# Patient Record
Sex: MALE | Race: OTHER | HISPANIC OR LATINO | URBAN - METROPOLITAN AREA
[De-identification: names, ages, dates, MRNs, and addresses within clinical notes are randomized per-mention and may not be internally consistent; named-entity substitution may affect disease eponyms.]

---

## 2020-11-06 ENCOUNTER — EMERGENCY (EMERGENCY)
Age: 50
LOS: 1 days | Discharge: HOME | End: 2020-11-06
Attending: STUDENT IN AN ORGANIZED HEALTH CARE EDUCATION/TRAINING PROGRAM | Admitting: STUDENT IN AN ORGANIZED HEALTH CARE EDUCATION/TRAINING PROGRAM
Payer: MEDICAID

## 2020-11-06 VITALS
HEART RATE: 96 BPM | OXYGEN SATURATION: 100 % | TEMPERATURE: 98 F | RESPIRATION RATE: 18 BRPM | DIASTOLIC BLOOD PRESSURE: 83 MMHG | SYSTOLIC BLOOD PRESSURE: 118 MMHG

## 2020-11-06 VITALS
HEART RATE: 104 BPM | TEMPERATURE: 98 F | RESPIRATION RATE: 18 BRPM | OXYGEN SATURATION: 100 % | SYSTOLIC BLOOD PRESSURE: 133 MMHG | DIASTOLIC BLOOD PRESSURE: 70 MMHG

## 2020-11-06 PROCEDURE — 99283 EMERGENCY DEPT VISIT LOW MDM: CPT

## 2020-11-06 PROCEDURE — 73590 X-RAY EXAM OF LOWER LEG: CPT | Mod: 26,RT

## 2020-11-06 PROCEDURE — 73610 X-RAY EXAM OF ANKLE: CPT | Mod: 26,RT

## 2020-11-06 RX ORDER — ACETAMINOPHEN 500 MG
975 TABLET ORAL ONCE
Refills: 0 | Status: COMPLETED | OUTPATIENT
Start: 2020-11-06 | End: 2020-11-06

## 2020-11-06 RX ADMIN — Medication 975 MILLIGRAM(S): at 18:35

## 2020-11-06 NOTE — ED PROVIDER NOTE - NS ED ROS FT
General: denies fever, chills  HENT: denies nasal congestion, rhinorrhea  Eyes: denies visual changes, blurred vision  CV: denies chest pain, palpitations  Resp: denies difficulty breathing, cough  Abdominal: denies nausea, vomiting, diarrhea, abdominal pain  : denies urinary pain or discharge  MSK: right ankle pain  Neuro: denies headaches, numbness, tingling  Skin: denies rashes, bruises

## 2020-11-06 NOTE — ED PROCEDURE NOTE - ATTENDING CONTRIBUTION TO CARE
AirCast placed for ankle sprain. XRay negatie for acute fracture. Procedure performed by intern. I was present for all key portions of procedure. Pt tolerated well.

## 2020-11-06 NOTE — ED PROVIDER NOTE - PHYSICAL EXAMINATION
GENERAL: well appearing in no acute distress, non-toxic appearing  HEAD: normocephalic, atraumatic  HENT: airway intact; neck supple  EYES: normal conjunctiva  CARDIAC: regular rate and rhythm, normal S1S2, no appreciable murmurs, 2+ pulses in UE/LE b/l  PULM: normal breath sounds, clear to ascultation bilaterally, no rales, rhonchi, wheezing  GI: abdomen nondistended, soft, nontender, no guarding, rebound tenderness  NEURO: no focal motor or sensory deficits,   MSK: tenderness over right posterior edge medial malleolus; profuse swelling over b/l malleoli; tenderness over calf; negative lance test; neurovascularly intact w/ FROM of foot and knee.   SKIN: well-perfused, extremities warm, no visible rashes

## 2020-11-06 NOTE — ED PROVIDER NOTE - CLINICAL SUMMARY MEDICAL DECISION MAKING FREE TEXT BOX
51yo M coming in for ankle pain after a fall. Exam is notable for b/l malleoli swelling and inability to bare weight likely secondary to fracture vs strain.  Plan: ankle xray + tib/fib films + pain control

## 2020-11-06 NOTE — ED STATDOCS - OBJECTIVE STATEMENT
49 yo M [facility] adolescent children for R ankle swelling following fall from ladder. No LOC, no other injuries. L ankle diffusely swollen, tender. Skin c/d/i, no laceration or open fracture. Unrelated, has pending neurology appointment next week for long standing history of neck pain and parasthesias down neck and back following fall 4 years ago. No pmhx, no meds, NKA. I performed a medical screening examination and determined this patient to be medically stable and will transfer to the [facility] ED for further care. heart and lung exam done and both did not reveal concerns for immediate intervention.

## 2020-11-06 NOTE — ED ADULT TRIAGE NOTE - CHIEF COMPLAINT QUOTE
Arrives from [facility] was working on a ladder and fell onto his right foot now presenting with right foot swelling and redness. Took ibuprofen prior to coming. +pedal pulse to extremity, noted to have some mobility, +sensation to extremity. Denies hitting head or blood thinner use, denies PMH

## 2020-11-06 NOTE — ED PROVIDER NOTE - NSFOLLOWUPCLINICS_GEN_ALL_ED_FT
[facility]  Sports Medicine  [address] [place] [address]  Phone: [phone]  Fax:   Follow Up Time:

## 2020-11-06 NOTE — ED PROVIDER NOTE - NSFOLLOWUPINSTRUCTIONS_ED_ALL_ED_FT
You were evaluated in the Emergency Department for ankle pain after a fall from a ladder.  You were evaluated and examined by a physician, and you had an xray of the ankle and the leg that did not indicate any acute fracture. Based on your evaluation, there are no signs of emergency conditions requiring admission to the hospital on today's workup.        We recommend that you:  1. See your primary care physician within the next 3-5 days Bring a copy of your discharge paperwork (including any test results) to your doctor.  2. We also recommend you see [facility]  3. Tylenol up to 650 mg every 8 hours as needed for pain and/or Motrin up to 600 mg every 6 hours as needed for pain. Take any prescribed medications as instructed:   4. Keep ankle elevated and apply cold compresses 20minutes on/ 20 minutes off.          SEEK IMMEDIATE MEDICAL CARE IF YOU HAVE ANY OF THE FOLLOWING SYMPTOMS: worsening pain, inability to move that body part, numbness or tingling.

## 2020-11-06 NOTE — ED PEDIATRIC TRIAGE NOTE - CHIEF COMPLAINT QUOTE
pt c/o right side ankle pain from today. pt fell off from 6ft ladder and twisted his ankle when he landed on his feet. denies head injury. pt is alert, awake and orientedx3. recent right leg surgery. received motrin PTA.

## 2020-11-06 NOTE — ED PROVIDER NOTE - PATIENT PORTAL LINK FT
You can access the FollowMyHealth Patient Portal offered by [facility] by registering at the following website: [email or web address]. By joining [employer]’s FollowMyHealth portal, you will also be able to view your health information using other applications (apps) compatible with our system.

## 2020-11-06 NOTE — ED PROVIDER NOTE - OBJECTIVE STATEMENT
51yo M coming after falling off a ladder (6ft). Patient was on a ladder outside of his house when the ladder twisted causing him to lose balance. Patient attempted to jump off the ladder before crashing down and landed on his right ankle on top of a rock trail. On the ground he noted his right ankle was inverted and he preceded to twist it back in place and heard a "snap". He was unable to walk immediately after and continues to be unable to bare weight. He has a history of an ankle surgery several years ago with hardware placed on the lateral ankle. He took motrin prior to coming to the ED.

## 2020-11-06 NOTE — ED PROVIDER NOTE - ATTENDING CONTRIBUTION TO CARE
50M p/w R ankle pain. Pt working on ladder when it began to twist - he jumped off falling onto affected limb. No head trauma/LOC. Noted R foot was inverted after fall when he manually "twisted" it back to normal orientation. Unable to bear weight and worsening swelling. +calf pain. No decreased sensation.  Gen: nad  CV: rrr, no murmur  Pulm: clear lungs  Abd: soft, nt,nd  Ext: R ankle swelling b/l malleoli with FROM of toes, cap refill < 2sec and sensation intact  MDM: 50M p/w R ankle pain s/p fall from ~6ft ladder - concern for fracture vs strain, will get XRay ankle and tib/fib given calf pain, pain control

## 2023-04-20 PROBLEM — Z78.9 OTHER SPECIFIED HEALTH STATUS: Chronic | Status: ACTIVE | Noted: 2020-11-06

## 2023-05-22 ENCOUNTER — INPATIENT (INPATIENT)
Facility: HOSPITAL | Age: 53
LOS: 3 days | Discharge: HOME | DRG: 441 | End: 2023-05-26
Attending: HOSPITALIST | Admitting: HOSPITALIST
Payer: MEDICAID

## 2023-05-22 VITALS
WEIGHT: 216.05 LBS | OXYGEN SATURATION: 97 % | SYSTOLIC BLOOD PRESSURE: 122 MMHG | HEIGHT: 68 IN | TEMPERATURE: 98 F | HEART RATE: 77 BPM | DIASTOLIC BLOOD PRESSURE: 84 MMHG | RESPIRATION RATE: 18 BRPM

## 2023-05-22 DIAGNOSIS — Z29.9 ENCOUNTER FOR PROPHYLACTIC MEASURES, UNSPECIFIED: ICD-10-CM

## 2023-05-22 DIAGNOSIS — I81 PORTAL VEIN THROMBOSIS: ICD-10-CM

## 2023-05-22 DIAGNOSIS — R93.2 ABNORMAL FINDINGS ON DIAGNOSTIC IMAGING OF LIVER AND BILIARY TRACT: ICD-10-CM

## 2023-05-22 DIAGNOSIS — Z98.890 OTHER SPECIFIED POSTPROCEDURAL STATES: Chronic | ICD-10-CM

## 2023-05-22 LAB
ALBUMIN SERPL ELPH-MCNC: 3.2 G/DL — LOW (ref 3.5–5)
ALP SERPL-CCNC: 76 U/L — SIGNIFICANT CHANGE UP (ref 40–120)
ALT FLD-CCNC: 39 U/L DA — SIGNIFICANT CHANGE UP (ref 10–60)
ANION GAP SERPL CALC-SCNC: 3 MMOL/L — LOW (ref 5–17)
APPEARANCE UR: CLEAR — SIGNIFICANT CHANGE UP
APTT BLD: 30.4 SEC — SIGNIFICANT CHANGE UP (ref 27.5–35.5)
APTT BLD: 40.2 SEC — HIGH (ref 27.5–35.5)
AST SERPL-CCNC: 21 U/L — SIGNIFICANT CHANGE UP (ref 10–40)
BASOPHILS # BLD AUTO: 0.03 K/UL — SIGNIFICANT CHANGE UP (ref 0–0.2)
BASOPHILS NFR BLD AUTO: 0.5 % — SIGNIFICANT CHANGE UP (ref 0–2)
BILIRUB SERPL-MCNC: 0.6 MG/DL — SIGNIFICANT CHANGE UP (ref 0.2–1.2)
BILIRUB UR-MCNC: NEGATIVE — SIGNIFICANT CHANGE UP
BUN SERPL-MCNC: 14 MG/DL — SIGNIFICANT CHANGE UP (ref 7–18)
CALCIUM SERPL-MCNC: 9.2 MG/DL — SIGNIFICANT CHANGE UP (ref 8.4–10.5)
CHLORIDE SERPL-SCNC: 112 MMOL/L — HIGH (ref 96–108)
CO2 SERPL-SCNC: 25 MMOL/L — SIGNIFICANT CHANGE UP (ref 22–31)
COLOR SPEC: YELLOW — SIGNIFICANT CHANGE UP
COMMENT - URINE: SIGNIFICANT CHANGE UP
CREAT SERPL-MCNC: 0.82 MG/DL — SIGNIFICANT CHANGE UP (ref 0.5–1.3)
DIFF PNL FLD: NEGATIVE — SIGNIFICANT CHANGE UP
EGFR: 104 ML/MIN/1.73M2 — SIGNIFICANT CHANGE UP
EOSINOPHIL # BLD AUTO: 0.03 K/UL — SIGNIFICANT CHANGE UP (ref 0–0.5)
EOSINOPHIL NFR BLD AUTO: 0.5 % — SIGNIFICANT CHANGE UP (ref 0–6)
GLUCOSE SERPL-MCNC: 91 MG/DL — SIGNIFICANT CHANGE UP (ref 70–99)
GLUCOSE UR QL: NEGATIVE — SIGNIFICANT CHANGE UP
HCT VFR BLD CALC: 42.2 % — SIGNIFICANT CHANGE UP (ref 39–50)
HCT VFR BLD CALC: 42.2 % — SIGNIFICANT CHANGE UP (ref 39–50)
HGB BLD-MCNC: 13.9 G/DL — SIGNIFICANT CHANGE UP (ref 13–17)
HGB BLD-MCNC: 14 G/DL — SIGNIFICANT CHANGE UP (ref 13–17)
IMM GRANULOCYTES NFR BLD AUTO: 0.2 % — SIGNIFICANT CHANGE UP (ref 0–0.9)
INR BLD: 1.27 RATIO — HIGH (ref 0.88–1.16)
KETONES UR-MCNC: NEGATIVE — SIGNIFICANT CHANGE UP
LACTATE SERPL-SCNC: 0.7 MMOL/L — SIGNIFICANT CHANGE UP (ref 0.7–2)
LACTATE SERPL-SCNC: 0.9 MMOL/L — SIGNIFICANT CHANGE UP (ref 0.7–2)
LEUKOCYTE ESTERASE UR-ACNC: NEGATIVE — SIGNIFICANT CHANGE UP
LIDOCAIN IGE QN: 258 U/L — SIGNIFICANT CHANGE UP (ref 73–393)
LYMPHOCYTES # BLD AUTO: 1.47 K/UL — SIGNIFICANT CHANGE UP (ref 1–3.3)
LYMPHOCYTES # BLD AUTO: 23.4 % — SIGNIFICANT CHANGE UP (ref 13–44)
MCHC RBC-ENTMCNC: 29.9 PG — SIGNIFICANT CHANGE UP (ref 27–34)
MCHC RBC-ENTMCNC: 30.5 PG — SIGNIFICANT CHANGE UP (ref 27–34)
MCHC RBC-ENTMCNC: 32.9 GM/DL — SIGNIFICANT CHANGE UP (ref 32–36)
MCHC RBC-ENTMCNC: 33.2 GM/DL — SIGNIFICANT CHANGE UP (ref 32–36)
MCV RBC AUTO: 90.8 FL — SIGNIFICANT CHANGE UP (ref 80–100)
MCV RBC AUTO: 91.9 FL — SIGNIFICANT CHANGE UP (ref 80–100)
MONOCYTES # BLD AUTO: 0.82 K/UL — SIGNIFICANT CHANGE UP (ref 0–0.9)
MONOCYTES NFR BLD AUTO: 13.1 % — SIGNIFICANT CHANGE UP (ref 2–14)
NEUTROPHILS # BLD AUTO: 3.92 K/UL — SIGNIFICANT CHANGE UP (ref 1.8–7.4)
NEUTROPHILS NFR BLD AUTO: 62.3 % — SIGNIFICANT CHANGE UP (ref 43–77)
NITRITE UR-MCNC: NEGATIVE — SIGNIFICANT CHANGE UP
NRBC # BLD: 0 /100 WBCS — SIGNIFICANT CHANGE UP (ref 0–0)
NRBC # BLD: 0 /100 WBCS — SIGNIFICANT CHANGE UP (ref 0–0)
PH UR: 8 — SIGNIFICANT CHANGE UP (ref 5–8)
PLATELET # BLD AUTO: 209 K/UL — SIGNIFICANT CHANGE UP (ref 150–400)
PLATELET # BLD AUTO: 211 K/UL — SIGNIFICANT CHANGE UP (ref 150–400)
POTASSIUM SERPL-MCNC: 4 MMOL/L — SIGNIFICANT CHANGE UP (ref 3.5–5.3)
POTASSIUM SERPL-SCNC: 4 MMOL/L — SIGNIFICANT CHANGE UP (ref 3.5–5.3)
PROT SERPL-MCNC: 7.4 G/DL — SIGNIFICANT CHANGE UP (ref 6–8.3)
PROT UR-MCNC: 15 MG/DL
PROTHROM AB SERPL-ACNC: 15.1 SEC — HIGH (ref 10.5–13.4)
RBC # BLD: 4.59 M/UL — SIGNIFICANT CHANGE UP (ref 4.2–5.8)
RBC # BLD: 4.65 M/UL — SIGNIFICANT CHANGE UP (ref 4.2–5.8)
RBC # FLD: 12 % — SIGNIFICANT CHANGE UP (ref 10.3–14.5)
RBC # FLD: 12.1 % — SIGNIFICANT CHANGE UP (ref 10.3–14.5)
RBC CASTS # UR COMP ASSIST: SIGNIFICANT CHANGE UP /HPF (ref 0–2)
SODIUM SERPL-SCNC: 140 MMOL/L — SIGNIFICANT CHANGE UP (ref 135–145)
SP GR SPEC: 1.01 — SIGNIFICANT CHANGE UP (ref 1.01–1.02)
UROBILINOGEN FLD QL: NEGATIVE — SIGNIFICANT CHANGE UP
WBC # BLD: 6.28 K/UL — SIGNIFICANT CHANGE UP (ref 3.8–10.5)
WBC # BLD: 7.21 K/UL — SIGNIFICANT CHANGE UP (ref 3.8–10.5)
WBC # FLD AUTO: 6.28 K/UL — SIGNIFICANT CHANGE UP (ref 3.8–10.5)
WBC # FLD AUTO: 7.21 K/UL — SIGNIFICANT CHANGE UP (ref 3.8–10.5)
WBC UR QL: SIGNIFICANT CHANGE UP /HPF (ref 0–5)

## 2023-05-22 PROCEDURE — 99223 1ST HOSP IP/OBS HIGH 75: CPT | Mod: GC

## 2023-05-22 PROCEDURE — 99291 CRITICAL CARE FIRST HOUR: CPT

## 2023-05-22 PROCEDURE — 71250 CT THORAX DX C-: CPT | Mod: 26

## 2023-05-22 PROCEDURE — 74177 CT ABD & PELVIS W/CONTRAST: CPT | Mod: 26,MG

## 2023-05-22 PROCEDURE — 99221 1ST HOSP IP/OBS SF/LOW 40: CPT

## 2023-05-22 PROCEDURE — G1004: CPT

## 2023-05-22 RX ORDER — HEPARIN SODIUM 5000 [USP'U]/ML
INJECTION INTRAVENOUS; SUBCUTANEOUS
Qty: 25000 | Refills: 0 | Status: DISCONTINUED | OUTPATIENT
Start: 2023-05-22 | End: 2023-05-22

## 2023-05-22 RX ORDER — HEPARIN SODIUM 5000 [USP'U]/ML
8000 INJECTION INTRAVENOUS; SUBCUTANEOUS EVERY 6 HOURS
Refills: 0 | Status: DISCONTINUED | OUTPATIENT
Start: 2023-05-22 | End: 2023-05-22

## 2023-05-22 RX ORDER — ENOXAPARIN SODIUM 100 MG/ML
100 INJECTION SUBCUTANEOUS EVERY 12 HOURS
Refills: 0 | Status: DISCONTINUED | OUTPATIENT
Start: 2023-05-22 | End: 2023-05-26

## 2023-05-22 RX ORDER — SODIUM CHLORIDE 9 MG/ML
1000 INJECTION INTRAMUSCULAR; INTRAVENOUS; SUBCUTANEOUS ONCE
Refills: 0 | Status: COMPLETED | OUTPATIENT
Start: 2023-05-22 | End: 2023-05-22

## 2023-05-22 RX ORDER — HEPARIN SODIUM 5000 [USP'U]/ML
8000 INJECTION INTRAVENOUS; SUBCUTANEOUS ONCE
Refills: 0 | Status: COMPLETED | OUTPATIENT
Start: 2023-05-22 | End: 2023-05-22

## 2023-05-22 RX ORDER — HEPARIN SODIUM 5000 [USP'U]/ML
4000 INJECTION INTRAVENOUS; SUBCUTANEOUS EVERY 6 HOURS
Refills: 0 | Status: DISCONTINUED | OUTPATIENT
Start: 2023-05-22 | End: 2023-05-22

## 2023-05-22 RX ORDER — SODIUM CHLORIDE 9 MG/ML
1000 INJECTION INTRAMUSCULAR; INTRAVENOUS; SUBCUTANEOUS
Refills: 0 | Status: DISCONTINUED | OUTPATIENT
Start: 2023-05-22 | End: 2023-05-23

## 2023-05-22 RX ADMIN — HEPARIN SODIUM 1800 UNIT(S)/HR: 5000 INJECTION INTRAVENOUS; SUBCUTANEOUS at 13:26

## 2023-05-22 RX ADMIN — HEPARIN SODIUM 8000 UNIT(S): 5000 INJECTION INTRAVENOUS; SUBCUTANEOUS at 13:26

## 2023-05-22 RX ADMIN — SODIUM CHLORIDE 100 MILLILITER(S): 9 INJECTION INTRAMUSCULAR; INTRAVENOUS; SUBCUTANEOUS at 13:35

## 2023-05-22 RX ADMIN — SODIUM CHLORIDE 100 MILLILITER(S): 9 INJECTION INTRAMUSCULAR; INTRAVENOUS; SUBCUTANEOUS at 18:18

## 2023-05-22 RX ADMIN — SODIUM CHLORIDE 1000 MILLILITER(S): 9 INJECTION INTRAMUSCULAR; INTRAVENOUS; SUBCUTANEOUS at 10:30

## 2023-05-22 RX ADMIN — SODIUM CHLORIDE 1000 MILLILITER(S): 9 INJECTION INTRAMUSCULAR; INTRAVENOUS; SUBCUTANEOUS at 10:13

## 2023-05-22 RX ADMIN — HEPARIN SODIUM 8000 UNIT(S): 5000 INJECTION INTRAVENOUS; SUBCUTANEOUS at 13:34

## 2023-05-22 RX ADMIN — ENOXAPARIN SODIUM 100 MILLIGRAM(S): 100 INJECTION SUBCUTANEOUS at 18:18

## 2023-05-22 NOTE — ED ADULT NURSE NOTE - NSFALLUNIVINTERV_ED_ALL_ED
Bed/Stretcher in lowest position, wheels locked, appropriate side rails in place/Call bell, personal items and telephone in reach/Instruct patient to call for assistance before getting out of bed/chair/stretcher/Non-slip footwear applied when patient is off stretcher/[place] to call system/Physically safe environment - no spills, clutter or unnecessary equipment/Purposeful proactive rounding/Room/bathroom lighting operational, light cord in reach

## 2023-05-22 NOTE — PATIENT PROFILE ADULT - NSPROHMSYMPCOND_GEN_A_NUR
Liver enzyme improved and all are now normal  Autoimmune screen negative  Hepatitis screen negative for B, shows she is non immune and should get the Hep B vaccine. If she has had any of the series, she should get booster. If she has not had it, get entire 3 part series via nurse visits. none

## 2023-05-22 NOTE — CONSULT NOTE ADULT - SUBJECTIVE AND OBJECTIVE BOX
HPI:   53 year old male with PMH of cholelithiasis and PSH of bilateral ablation of varicose veins presented to the ED with his daughter who provides translation at bedside. Patient complaining of abdominal pain with bloating and fatigue every time he eats for the past 10 days.  Tolerating diet. Last BM this AM without blood in the stool. Admits to subjective fevers last night. Denies chest pain, shortness of breath, nausea, vomiting, diarrhea, difficulty walking, history of clotting disorder. Denies current smoking, drinking, and drug abuse. Patient had a history of drinking, but quit 7 years ago.    PAST MEDICAL & SURGICAL HISTORY:  bilateral lower extremity ablation for varicose veins at [facility] 9/3/22    Review of Systems: Contained within HPI     MEDICATIONS  (STANDING):  heparin   Injectable 8000 Unit(s) IV Push once  heparin  Infusion.  Unit(s)/Hr (18 mL/Hr) IV Continuous <Continuous>    MEDICATIONS  (PRN):  heparin   Injectable 8000 Unit(s) IV Push every 6 hours PRN For aPTT less than 40  heparin   Injectable 4000 Unit(s) IV Push every 6 hours PRN For aPTT between 40 - 57    Allergies: No Known Allergies    SOCIAL HISTORY: Denies smoking, ETOH abuse, drug abuse    Vital Signs Last 24 Hrs  T(C): 36.7 (22 May 2023 08:42), Max: 36.7 (22 May 2023 08:42)  T(F): 98 (22 May 2023 08:42), Max: 98 (22 May 2023 08:42)  HR: 77 (22 May 2023 08:42) (77 - 77)  BP: 122/84 (22 May 2023 08:42) (122/84 - 122/84)  RR: 18 (22 May 2023 08:42) (18 - 18)  SpO2: 97% (22 May 2023 08:42) (97% - 97%)  Parameters below as of 22 May 2023 08:42  Patient On (Oxygen Delivery Method): room air    Physical Exam:  General:  Appears stated age, well-groomed, well-nourished, no distress  Eyes: EOMI  HENT:  WNL, no JVD  Chest: respirations nonlabored  Cardiovascular:  Regular rate & rhythm  Abdomen: soft, mild distention, nontender to palpation  Extremities: no edema bilaterally, warm  Vascular: Palpable DP and PT pulses bilaterally  Skin: warm and dry  Musculoskeletal: no calf tenderness  Neuro:  Alert, oriented to time, place and person   Psych: normal affect    LABS:                        14.0   6.28  )-----------( 211      ( 22 May 2023 09:59 )             42.2         140  |  112<H>  |  14  ----------------------------<  91  4.0   |  25  |  0.82    Ca    9.2      22 May 2023 09:59    TPro  7.4  /  Alb  3.2<L>  /  TBili  0.6  /  DBili  x   /  AST  21  /  ALT  39  /  AlkPhos  76      PT/INR - ( 22 May 2023 12:33 )   PT: 15.1 sec;   INR: 1.27 ratio      Lactate, Blood: 0.7 mmol/L (23 @ 09:59)    PTT - ( 22 May 2023 12:33 )  PTT:30.4 sec  Urinalysis Basic - ( 22 May 2023 09:59 )    Color: Yellow / Appearance: Clear / S.015 / pH: x  Gluc: x / Ketone: Negative  / Bili: Negative / Urobili: Negative   Blood: x / Protein: 15 mg/dL / Nitrite: Negative   Leuk Esterase: Negative / RBC: 0-2 /HPF / WBC 0-2 /HPF   Sq Epi: x / Non Sq Epi: x / Bacteria: x    RADIOLOGY & ADDITIONAL STUDIES:  < from: CT Abdomen and Pelvis w/ IV Cont (23 @ 11:08) >  ACC: [id number] EXAM:  CT ABDOMEN AND PELVIS IC   ORDERED BY: SERG MARTE     PROCEDURE DATE:  2023      INTERPRETATION:  CLINICAL INFORMATION: Abdominal pain and distention    COMPARISON: None.    CONTRAST/COMPLICATIONS:  IV Contrast: Omnipaque 350  90 cc administered   10 cc discarded  Oral Contrast: NONE  Complications: None reported at time of study completion    PROCEDURE:  CT of the Abdomen and Pelvis was performed.  Sagittal and coronal reformats were performed.    FINDINGS:  LOWER CHEST: Within normal limits.    LIVER: Small hypodense lesion in the right hepatic lobe, too small to   characterize. Mild heterogeneous enhancement pattern of the liver.  BILE DUCTS: Normal caliber.  GALLBLADDER: Cholelithiasis. No evidence for thickened gallbladder wall   or pericholecystic fluid.  SPLEEN: Within normal limits.  PANCREAS: Within normal limits.  ADRENALS: Within normal limits.  KIDNEYS/URETERS: Within normal limits except for a few small hypodense   lesions in the left kidney, too small to characterize.    BLADDER: Within normal limits.  REPRODUCTIVE ORGANS: The prostate and seminal vesicles appear grossly   unremarkable.    BOWEL: No bowel obstruction. Appendix within normal limits.  PERITONEUM: No free air.Mesenteric edema and trace mesenteric ascites.  VESSELS: Filling defects are seen in the left portal vein, right portal   vein, main portal vein, portal confluence, SMV, upper IMV, and distal   splenic vein, likely representing thrombi. Associated varices. Moderate   stenosis at the proximal left internal iliac artery, likely due to   atherosclerotic disease.  RETROPERITONEUM/LYMPH NODES: No lymphadenopathy.  ABDOMINAL WALL: Small fat-containing umbilical hernia. Mild   fat-containing inguinal hernias bilaterally.  BONES: Degenerative spondylosis.    IMPRESSION:  Extensive thrombosis of the portal veins, portal confluence, SMV, upper   IVM, and the distal splenic vein. Associated varices. Associated   heterogeneous enhancement pattern of the liver. Mesenteric edema and   trace mesenteric ascites for which associated bowel ischemia is   considered. Clinical correlation is recommended.    Cholelithiasis. No evidence for thickened gallbladder wall or   pericholecystic fluid.    Dr. SERG MARTE is informed with read back.    --- End of Report ---    SHIRLEY MAGDALENO MD; Attending Radiologist  This document has been electronically signed. May 22 2023 11:46AM    < end of copied text >

## 2023-05-22 NOTE — ED PROVIDER NOTE - CLINICAL SUMMARY MEDICAL DECISION MAKING FREE TEXT BOX
ATTG: : Assessment/plan: Abdominal pain with distention concerns include but not limited to ascites, less likely obstruction or acute cholecystitis we will check labs we will check urine we will check CT abdomen pelvis IV fluids pain medication and reeval for disposition.

## 2023-05-22 NOTE — CONSULT NOTE ADULT - NS ATTEND AMEND GEN_ALL_CORE FT
Patient seen/examined. 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 Abdomen is benign. Recommend close monitoring with serial bloodwork/abdominal exams. Full anticoagulation. General surgery consultation for assessment of bowel viability. Hematology/hematology consultation for hypercoagulable workup. Will follow.

## 2023-05-22 NOTE — CONSULT NOTE ADULT - NSCONSULTADDITIONALINFOA_GEN_ALL_CORE
Pt seen and examined in ED. Daughter Bharath at bedside provides assistance with translation. Patient reports a 7-10 day hx of nondescript abdominal pain, worse after eating and at times gets bloated.  His daughter offers history that her dad works out every day at the gym b/t 6AM to noon. He reports he stays hydrated and drinks a lot of water during his workouts. He works in construction.  Last BM and flatus prior to coming to ED.   Labs reviewed, WBC nml, lac 0.7. CT images reviewed  trace mesenteric edema/fluid, no FA no pneumatosis  Gen- NAD. Abd- soft, non distended, minimal upper abdominal discomfort to palpation, no guarding no rebound.  Vascular consulted, recommend systemic anticoagulation. Will consult hepatology, ICU.    Lactate nml and abdominal exam benign, no obvious indication for urgent diagnostic laparoscopy. Will monitor clsoely with serial abdominal exams

## 2023-05-22 NOTE — ED PROVIDER NOTE - PHYSICAL EXAMINATION
Gen.  mild discomfort, no acute respiratory distress  HEENT:  perrl eomi  Lungs:  b/l bs  CVS: S1S2   Abd;  no guarding no distention, diffuse mild tenerness  Ext: no edema no erythema  Neuro:aaox3, no focal deficits  MSK: strength 5/5 b/l upper and lower ext.

## 2023-05-22 NOTE — CONSULT NOTE ADULT - NS ATTEND AMEND GEN_ALL_CORE FT
CC: PVT in setting of possible hepatic lesions  HPI: 53 yrs old M w/ PMH of EtOH use s/p  bilateral varicose veins surgery and Rt leg plate after fx p/w ab pain/bloating and found w/ portal vein thrombus.  He had a recent colonoscopy about one month ago which showed a benign 7 mm polyp in the transverse colon    INTERVAL HPI: Patient seen and examined at bedside; Events noted; interview done via [employer] phone bank  in ED; Patient c/o RUE discomfort/bloating.    PMH/PSH as above    FmHx/Sox Hx NC    ROS as above; pt is not able to provide detailed review of systems  General: Noncontributory;	Skin/Breast: NC;Ophthalmologic:NC; ENMT: NC; Respiratory and Thorax: NC; Cardiovascular: NC; 	  Gastrointestinal: NC; Genitourinary:NC;  Musculoskeletal:NC; Neurological: NC; Psychiatric: NC; Hematology/Lymphatics: NC; Endocrine: NC; Allergic/Immunologic: NC    MEDICATIONS  (STANDING):  enoxaparin Injectable 100 milliGRAM(s) SubCutaneous every 12 hours  sodium chloride 0.9%. 1000 milliLiter(s) (100 mL/Hr) IV Continuous <Continuous>    MEDICATIONS  (PRN):      Vital Signs Last 24 Hrs  T(C): 36.8 (22 May 2023 15:35), Max: 36.8 (22 May 2023 15:35)  T(F): 98.3 (22 May 2023 15:35), Max: 98.3 (22 May 2023 15:35)  HR: 76 (22 May 2023 15:35) (76 - 77)  BP: 122/85 (22 May 2023 15:35) (122/84 - 122/85)  RR: 18 (22 May 2023 15:35) (18 - 18)  SpO2: 97% (22 May 2023 15:35) (97% - 97%)    Parameters below as of 22 May 2023 08:42  Patient On (Oxygen Delivery Method): room air      _________________  PHYSICAL EXAM:  ---------------------------  GEN: NAD; NC/AT; A and O x 3  HEENT: PERRLA: EOMI; supple  LUNGS: no wheezing; decreased bilateral air entry; no use of accessory muscles for breathing  HEART: Nl S1 S2; no M   ABDOMEN: Soft, RUQ discomfort w/ deep palpation, non distended  EXTREMITIES: no cyanosis; no edema; warm and dry  NERVOUS SYSTEM:  Awake and alert; no focal neuro  deficits    _________________________________________________  LABS:                        14.0   6.28  )-----------( 211      ( 22 May 2023 09:59 )             42.2     05-22    140  |  112<H>  |  14  ----------------------------<  91  4.0   |  25  |  0.82    Ca    9.2      22 May 2023 09:59    TPro  7.4  /  Alb  3.2<L>  /  TBili  0.6  /  DBili  x   /  AST  21  /  ALT  39  /  AlkPhos  76  05-22    PT/INR - ( 22 May 2023 12:33 )   PT: 15.1 sec;   INR: 1.27 ratio         PTT - ( 22 May 2023 12:33 )  PTT:30.4 sec    A/P    Problem #1 PVT - unprovoked; unclear etiology; will need CT chest and MRI of the liver  -continue w/ heparin drip or transition to LMWH at 1 mg/kg q12 or DOAC (i.e. apixaban loading dose followed by maintenance dose) if no isurgical intervention is planned  -agree w/ malignancy work up as well as MPN and APLA and LDH    Problem #2 Liver lesion - CT finding suggestive of abnormal lesions; obtain MRI, liver protocol  -obtain AFP, CEA, CA 19-9 as well as coags and hep B/C/HIV screening  -obtain outside colonoscopy report    I have examined the patient at bedside and reviewed patient's data and participated in the management of the patient along with Valeria Gonzalez well as hemotology/med oncology faculty consisting of Dr. OMAR Leal, Dr. OMAR Hutton, Dr. Chris Salazar, Dr. Ritu Messer, Dr. Titus Vora as well as myself during the daily heme/onc case review. I reviewed pertinent clinical information, PE,  labs as well as A/P as outline above.     Call with questions [phone]    Miguel Angel Calvin MD

## 2023-05-22 NOTE — H&P ADULT - NSHPPHYSICALEXAM_GEN_ALL_CORE
GENERAL: NAD, lying in bed comfortably  HEAD:  Atraumatic, Normocephalic  EYES: EOMI, PERRLA, conjunctiva and sclera clear  ENT: Moist mucous membranes  NECK: Supple, No JVD  CHEST/LUNG: Clear to auscultation bilaterally; No rales, rhonchi, wheezing, or rubs. Unlabored respirations  HEART: Regular rate and rhythm; No murmurs, rubs, or gallops  ABDOMEN: Bowel sounds present; Soft, + mild tenderness in the epigastric and RUQ, + mild rebound tenderness in RUQ, mildly distended, no visible varicose veins noted.   EXTREMITIES:  2+ Peripheral Pulses, brisk capillary refill. No clubbing, cyanosis, or edema  NERVOUS SYSTEM:  Alert & Oriented X3, speech clear. No deficits   MSK: FROM all 4 extremities, full and equal strength  SKIN: No rashes or lesions

## 2023-05-22 NOTE — PROGRESS NOTE ADULT - SUBJECTIVE AND OBJECTIVE BOX
By to see pt. In ED holding. Reports passed flatus had a watery BM, denies any blood. He denies any abdominal pain at the present.  No heparin gtt seen at bedside. Was discontinued and pt started on full dose Lovenox Q12 per Hem-onc recommendations.  Pt confirms her received the shot.  Abd- soft, non distended, no guarding no rebound.  Asked Nurse to reconnect pt to IVF. Vitals reviewed, HD stable normotensive, HR 73.  Abd exam benign. Continue IVF, NPO for now. Continue anticoagulation. Recheck lactate

## 2023-05-22 NOTE — H&P ADULT - PROBLEM SELECTOR PLAN 2
No hx of cancer, weight loss, or other constitutional symptoms,   Pt quit smoking and drinking for the past 8 yrs, no hx of cirrhosis  CT abd and pelvis w IV contrast: Small hypodense lesion in the right hepatic lobe, too small to characterize. Mild heterogeneous enhancement pattern of the liver.  - f/u tumor markers AFP, CEA,   - QMA consulted

## 2023-05-22 NOTE — ED PROVIDER NOTE - OBJECTIVE STATEMENT
53-year-old male with PMHx of peripheral vascular disease presents for abdominal pain and distention for approximately 10 days.  He said that he had been diagnosed with gallstones approximately 1 month ago but deferred surgery at the time.  He states that he has been cautious with his diet.  Now anytime after he eats he feels like his abdomen is distended and feels some discomfort described as pressure-like.  There is no associated fever or chills.  There is no nausea vomiting or diarrhea.  There is no recent travel or antibiotics.  There is no urinary symptoms.  His daughter is with him at the bedside providing additional history.

## 2023-05-22 NOTE — CONSULT NOTE ADULT - SUBJECTIVE AND OBJECTIVE BOX
Reason for Admission: Portal vein thrombosis and suspected malignancy  History of Present Illness:   53 yrs old M, previous heavy alcohol drinker and smoker, pshx of s/p  bilateral varicose veins surgery and Rt leg plate after fx, presented with increased post-prandial bloating. Pt reported that he has been having bloating after food for the past 10 days which has increased in intensity however for the past day it got worse and was associated with hyperventilation, subjective fever. Pt stated that he was not able to bend forward, Pt denied nausea, vomiting, diarrhea, weight loss, changes in the bowel habit, hematemesis, hematochezia, or dark stools.   Pt was a heavy alcohol drink and smoked one pack of cigarettes daily for 22 yrs however he has quit both for the past 8 yrs. He exercises daily and uses pre-workout pills and protein powder post workout. Family hx is only significant for DM in father. Pt denied any medications including over the counter and herbal.   He had a recent colonoscopy about one month ago which showed a benign 7 mm polyp in the transverse colon      #[id number]    REVIEW OF SYSTEMS:    CONSTITUTIONAL: No weakness, +subjective fever [resolved], No chills  EYES/ENT: No visual changes;  No vertigo or throat pain   NECK: No pain or stiffness  RESPIRATORY: No cough, wheezing, hemoptysis; No shortness of breath  CARDIOVASCULAR: No chest pain or palpitations  GASTROINTESTINAL: No abdominal or epigastric pain. + abdominal bloating, No nausea, vomiting, or hematemesis; No diarrhea or constipation. No melena or hematochezia.  GENITOURINARY: No dysuria, frequency or hematuria  NEUROLOGICAL: No numbness or weakness  SKIN: No itching, rashes      Allergies and Intolerances:        Allergies:  	No Known Allergies:     Home Medications:   * Outpatient Medication Status not yet specified    .    Patient History:    Past Medical, Past Surgical, and Family History:  PAST MEDICAL HISTORY:  No pertinent past medical history.     PAST SURGICAL HISTORY:  H/O varicose vein ligation.     FAMILY HISTORY:  Father  Still living? Unknown  FH: type 2 diabetes, Age at diagnosis: Age Unknown.     Social History:  · Substance use	No  · Social History (marital status, living situation, occupation, and sexual history)	Pt is , living with family, previous heavy alcohol drinker and smoker about a pack for 22 yrs. Quit both about 8 yrs ago, denied any substance use.      MEDICATIONS  (STANDING):  heparin   Injectable 8000 Unit(s) IV Push once  heparin  Infusion.  Unit(s)/Hr (18 mL/Hr) IV Continuous <Continuous>  sodium chloride 0.9%. 1000 milliLiter(s) (100 mL/Hr) IV Continuous <Continuous>    MEDICATIONS  (PRN):  heparin   Injectable 4000 Unit(s) IV Push every 6 hours PRN For aPTT between 40 - 57  heparin   Injectable 8000 Unit(s) IV Push every 6 hours PRN For aPTT less than 40    CAPILLARY BLOOD GLUCOSE        I&O's Summary      PHYSICAL EXAM:  Vital Signs Last 24 Hrs  T(C): 36.8 (22 May 2023 15:35), Max: 36.8 (22 May 2023 15:35)  T(F): 98.3 (22 May 2023 15:35), Max: 98.3 (22 May 2023 15:35)  HR: 76 (22 May 2023 15:35) (76 - 77)  BP: 122/85 (22 May 2023 15:35) (122/84 - 122/85)  BP(mean): --  RR: 18 (22 May 2023 15:35) (18 - 18)  SpO2: 97% (22 May 2023 15:35) (97% - 97%)    Parameters below as of 22 May 2023 08:42  Patient On (Oxygen Delivery Method): room air    GEN: NAD; A and O x 3  LUNGS: CTA B/L  HEART: S1 S2  ABDOMEN: soft, Right mid abdomen tenderness, non-distended, + BS  EXTREMITIES: clubbing  NERVOUS SYSTEM:  Awake and alert; no focal neuro deficits      LABS:                        14.0   6.28  )-----------( 211      ( 22 May 2023 09:59 )             42.2     05-    140  |  112<H>  |  14  ----------------------------<  91  4.0   |  25  |  0.82    Ca    9.2      22 May 2023 09:59    TPro  7.4  /  Alb  3.2<L>  /  TBili  0.6  /  DBili  x   /  AST  21  /  ALT  39  /  AlkPhos  76  05-22    PT/INR - ( 22 May 2023 12:33 )   PT: 15.1 sec;   INR: 1.27 ratio         PTT - ( 22 May 2023 12:33 )  PTT:30.4 sec      Urinalysis Basic - ( 22 May 2023 09:59 )    Color: Yellow / Appearance: Clear / S.015 / pH: x  Gluc: x / Ketone: Negative  / Bili: Negative / Urobili: Negative   Blood: x / Protein: 15 mg/dL / Nitrite: Negative   Leuk Esterase: Negative / RBC: 0-2 /HPF / WBC 0-2 /HPF   Sq Epi: x / Non Sq Epi: x / Bacteria: x            RADIOLOGY & ADDITIONAL TESTS:  < from: CT Abdomen and Pelvis w/ IV Cont (23 @ 11:08) >    ACC: [id number] EXAM:  CT ABDOMEN AND PELVIS IC   ORDERED BY: SERG MARTE     PROCEDURE DATE:  2023          INTERPRETATION:  CLINICAL INFORMATION: Abdominal pain and distention    COMPARISON: None.    CONTRAST/COMPLICATIONS:  IV Contrast: Omnipaque 350  90 cc administered   10 cc discarded  Oral Contrast: NONE  Complications: None reported at time of study completion    PROCEDURE:  CT of the Abdomen and Pelvis was performed.  Sagittal and coronal reformats were performed.    FINDINGS:  LOWER CHEST: Within normal limits.    LIVER: Small hypodense lesion in the right hepatic lobe, too small to   characterize. Mild heterogeneous enhancement pattern of the liver.  BILE DUCTS: Normal caliber.  GALLBLADDER: Cholelithiasis. No evidence for thickened gallbladder wall   or pericholecystic fluid.  SPLEEN: Within normal limits.  PANCREAS: Within normal limits.  ADRENALS: Within normal limits.  KIDNEYS/URETERS: Within normal limits except for a few small hypodense   lesions in the left kidney, too small to characterize.    BLADDER: Within normal limits.  REPRODUCTIVE ORGANS: The prostate and seminal vesicles appear grossly   unremarkable.    BOWEL: No bowel obstruction. Appendix within normal limits.  PERITONEUM: No free air.Mesenteric edema and trace mesenteric ascites.  VESSELS: Filling defects are seen in the left portal vein, right portal   vein, main portal vein, portal confluence, SMV, upper IMV, and distal   splenic vein, likely representing thrombi. Associated varices. Moderate   stenosis at the proximal left internal iliac artery, likely due to   atherosclerotic disease.  RETROPERITONEUM/LYMPH NODES: No lymphadenopathy.  ABDOMINAL WALL: Small fat-containing umbilical hernia. Mild   fat-containing inguinal hernias bilaterally.  BONES: Degenerative spondylosis.    IMPRESSION:  Extensive thrombosis of the portal veins, portal confluence, SMV, upper   IVM, and the distal splenic vein. Associated varices. Associated   heterogeneous enhancement pattern of the liver. Mesenteric edema and   trace mesenteric ascites for which associated bowel ischemia is   considered. Clinical correlation is recommended.    Cholelithiasis. No evidence for thickened gallbladder wall or   pericholecystic fluid.    Dr. SERG MARTE is informed with read back.    < end of copied text >

## 2023-05-22 NOTE — ED PROVIDER NOTE - DIFFERENTIAL DIAGNOSIS
Assessment/plan: Abdominal pain with distention concerns include but not limited to ascites, less likely obstruction or acute cholecystitis Differential Diagnosis

## 2023-05-22 NOTE — H&P ADULT - NSHPREVIEWOFSYSTEMS_GEN_ALL_CORE
REVIEW OF SYSTEMS:    CONSTITUTIONAL: No weakness, +subjective fever [resolved], No chills  EYES/ENT: No visual changes;  No vertigo or throat pain   NECK: No pain or stiffness  RESPIRATORY: No cough, wheezing, hemoptysis; No shortness of breath  CARDIOVASCULAR: No chest pain or palpitations  GASTROINTESTINAL: No abdominal or epigastric pain. + abdominal bloating, No nausea, vomiting, or hematemesis; No diarrhea or constipation. No melena or hematochezia.  GENITOURINARY: No dysuria, frequency or hematuria  NEUROLOGICAL: No numbness or weakness  SKIN: No itching, rashes

## 2023-05-22 NOTE — ED PROVIDER NOTE - NSFOLLOWUPINSTRUCTIONS_ED_ALL_ED_FT
Your diagnosis this visit was:     From this ED visit you were prescribed:    You may be contacted by our Emergency Department Referrals Coordinator to set up your follow-up appointment within 24-48 hours of your discharge, Monday through Friday. We recommend you follow up with:    Please return to the Emergency Department if you experience any of the following symptoms:  - Shortness of breath or trouble breathing  - Pressure, pain, or tightness in the chest  - Face drooping, arm weakness or speech difficulty,  - Persistent or severe vomiting  - Head injury or loss of consciousness  - Nonstop bleeding or an open wound

## 2023-05-22 NOTE — H&P ADULT - NSHPSOCIALHISTORY_GEN_ALL_CORE
Pt is , living with family, previous heavy alcohol drinker and smoker about a pack for 22 yrs. Quit both about 8 yrs ago, denied any substance use.

## 2023-05-22 NOTE — PATIENT PROFILE ADULT - FALL HARM RISK - UNIVERSAL INTERVENTIONS
Bed in lowest position, wheels locked, appropriate side rails in place/Call bell, personal items and telephone in reach/Instruct patient to call for assistance before getting out of bed or chair/Non-slip footwear when patient is out of bed/[place] to call system/Physically safe environment - no spills, clutter or unnecessary equipment/Purposeful Proactive Rounding/Room/bathroom lighting operational, light cord in reach

## 2023-05-22 NOTE — CONSULT NOTE ADULT - ASSESSMENT
53 yrs old M, previous heavy alcohol drinker and smoker, pshx of s/p  bilateral varicose veins surgery and Rt leg plate after fx, presented with increased post-prandial bloating. Pt reported that he has been having bloating after food for the past 10 days which has increased in intensity however for the past day it got worse and was associated with hyperventilation, subjective fever. Pt stated that he was not able to bend forward, Pt denied nausea, vomiting, diarrhea, weight loss, changes in the bowel habit, hematemesis, hematochezia, or dark stools.   Pt was a heavy alcohol drink and smoked one pack of cigarettes daily for 22 yrs however he has quit both for the past 8 yrs. He exercises daily and uses pre-workout pills and protein powder post workout. Family hx is only significant for DM in father. Pt denied any medications including over the counter and herbal.   He had a recent colonoscopy about one month ago which showed a benign 7 mm polyp in the transverse colon    #Abdominal Thrombosis  p/w abdominal distention and ? fever, denies wt loss  recent colonoscopy with small polyp  denies hx of VTE or malignancy no FamHx of VTE/malignancy  CBC nl  mild hypoalbuminemia  CT A/P shows: Extensive thrombosis of the portal veins, portal confluence, SMV, upper IVM, and the distal splenic vein. Associated varices. Associated heterogeneous enhancement pattern of the liver. Mesenteric edema and trace mesenteric ascites for which associated bowel ischemia is considered.   Rec's:  -Chest CT r/o mass  -MRI liver  -check TM's  -check APLA and MPN panel  -continue a/c, recommend to change heparin gtt to full-dose lovenox  -may change to DOAC if no plan for procedures, Bx, etc.  further recommendations pending above    Thank you for the referral. Will continue to monitor the patient.  Please call with any questions [phone]  Above reviewed with Attending Dr. Nikunj MARTINEZ/[facility]  [address] [facility], [address], [place], [place]  [phone]  *Note not finalized until signed by Attending Physician  
53 year old male presenting with abdominal pain and bloating after eating for the past 10 days found to have extensive thrombosis of portal veins, portal confluence, SMV, upper IMV, distal splenic vein, and associated varices.   Also noted to have mesenteric edema and trace mesenteric ascites  Labs and vital signs stable     - recommend full dose anticoagulation  - recommend hematology and hepatology consults for evaluation of source of bleeding and varices  - general surgery follow up for further monitoring for bowel ischemia  - medical management/follow up, possible ICU consult  - discussed with Dr. Barrett 
 53 y.o. Male w. CT findings of mesenteric edema and trace mesenteric ascites for which associated bowel ischemia is considered  CT: Extensive thrombosis of the portal veins, portal confluence, SMV, upper IVM, and the distal splenic vein. Associated varices. Associated   heterogeneous enhancement pattern of the liver.   afebrile, vitals wnl, wbc wnl, lactate wnl, lft wnl     - ICU consult   - serial abdominal exams  - anticoagulation   - recommend hematology consult  - recommend hepatology consult   - f/u vascular consult recs  - discussed and agreed with Dr. Hadley

## 2023-05-22 NOTE — CONSULT NOTE ADULT - NS ATTEND AMEND GEN_ALL_CORE FT
Pt seen and examined in ED. Daughter Bharath at bedside provides assistance with translation. Patient reports a 7-10 day hx of nondescript abdominal pain, worse after eating and at times gets bloated.  His daughter offers history that her dad works out every day at the gym b/t 6AM to noon. He reports he stays hydrated and drinks a lot of water during his workouts. He works in construction.  Last BM and flatus prior to coming to ED.   Labs reviewed, WBC nml, lac 0.7. CT images reviewed  trace mesenteric edema/fluid, no FA no pneumatosis  Gen- NAD. Abd- soft, non distended, minimal upper abdominal discomfort to palpation, no guarding no rebound.  Vascular consulted, recommend systemic anticoagulation. Will consult hepatology, ICU.    Lactate nml and abdominal exam benign, no obvious indication for urgent diagnostic laparoscopy. Will monitor clsoely with serial abdominal exams Pt seen and examined in ED. Daughter Bharath at bedside provides assistance with translation. Patient reports a 7-10 day hx of nondescript abdominal pain, worse after eating and at times gets bloated.  His daughter offers history that her dad works out every day at the gym b/t 6AM to noon. He reports he stays hydrated and drinks a lot of water during his workouts. He works in construction.  Last BM and flatus prior to coming to ED.   Labs reviewed, WBC nml, lac 0.7. CT images reviewed  trace mesenteric edema/fluid, no FA no pneumatosis  Gen- NAD. Abd- soft, non distended, minimal upper abdominal discomfort to palpation, no guarding no rebound.  Vascular consulted, recommend systemic anticoagulation. Will consult hepatology, ICU.    Lactate nml and abdominal exam benign, no obvious indication for urgent diagnostic laparoscopy. Will monitor closely with serial abdominal exams

## 2023-05-22 NOTE — H&P ADULT - HISTORY OF PRESENT ILLNESS
53 yrs old M, previous heavy alcohol drinker and smoker, pshx of s/p  bilateral varicose veins surgery and Rt leg plate after fx, presented with increased post-prandial bloating. Pt reported that he has been having bloating after food for the past 10 days which has increased in intensity however for the past day it got worse and was associated with hyperventilation, subjective fever. Pt stated that he was not able to bend forward, Pt denied nausea, vomiting, diarrhea, weight loss, changes in the bowel habit, hematemesis, hematochezia, or dark stools.   Pt was a heavy alcohol drink and smoked one pack of cigarettes daily for 22 yrs however he has quit both for the past 8 yrs. He exercises daily and uses pre-workout pills and protein powder post workout. Family hx is only significant for DM in father. Pt denied any medications including over the counter and herbal.    53 yrs old M, previous heavy alcohol drinker and smoker, pshx of s/p  bilateral varicose veins surgery and Rt leg plate after fx, presented with increased post-prandial bloating. Pt reported that he has been having bloating after food for the past 10 days which has increased in intensity however for the past day it got worse and was associated with hyperventilation, subjective fever. Pt stated that he was not able to bend forward, Pt denied nausea, vomiting, diarrhea, weight loss, changes in the bowel habit, hematemesis, hematochezia, or dark stools.   Pt was a heavy alcohol drink and smoked one pack of cigarettes daily for 22 yrs however he has quit both for the past 8 yrs. He exercises daily and uses pre-workout pills and protein powder post workout. Family hx is only significant for DM in father. Pt denied any medications including over the counter and herbal.   He had a recent colonoscopy about one month ago which showed a benign 7 mm polyp in the transverse colon

## 2023-05-22 NOTE — PATIENT PROFILE ADULT - HISTORY OF COVID-19 VACCINATION
Problem: Potential for Falls  Goal: Patient will remain free of falls  Description: INTERVENTIONS:  - Educate patient/family on patient safety including physical limitations  - Instruct patient to call for assistance with activity   - Consult OT/PT to assist with strengthening/mobility   - Keep Call bell within reach  - Keep bed low and locked with side rails adjusted as appropriate  - Keep care items and personal belongings within reach  - Initiate and maintain comfort rounds  - Make Fall Risk Sign visible to staff  - Offer Toileting every 2 Hours, in advance of need  - Initiate/Maintain bed and chair alarm  - Obtain necessary fall risk management equipment: chair alarm  - Apply yellow socks and bracelet for high fall risk patients  - Consider moving patient to room near nurses station  Outcome: Progressing Yes

## 2023-05-22 NOTE — H&P ADULT - ATTENDING COMMENTS
Daughter- Bharath interpreted in [place]  at baseline no acute risk factor of thrombosis. patient had Colonoscopy on 4/3/23 which showed benign polyp, no wt loss. patient stopped smoking and drinking 8 years ago. c./o abdominal distension, satiety and pain x 10 days, found to have extensive thrombosis. Vacular and surgery consult appreciated, lactate normal, no indication for ICU consult at this time. d/w ED attending Dr. Ramirez  -Heme/onc consulted- will get CT Chest and MRI liver and tumor markers  -will consult Hepatology post imaging  -AC changed to lovenox per heme recs  - npo for now, ivf  -check all labs per heme recs  -all admission labs and imaging reviewed      a/p# Extensive abdominal Vein thrombosis

## 2023-05-22 NOTE — ED ADULT NURSE NOTE - NSFALLLASTSIX_ED_ALL_ED
Quality 402: Tobacco Use And Help With Quitting Among Adolescents: Patient screened for tobacco and never smoked No.

## 2023-05-22 NOTE — H&P ADULT - PROBLEM SELECTOR PLAN 1
No hx of cancer, weight loss, or other constitutional symptoms,   Pt quit smoking and drinking for the past 8 yrs, no hx of cirrhosis   ? Hepatic malignancy vs hypercoagulable state vs Budd- Chiari syndrome    CT abd and pelvis w IV contrast: noted - Extensive thrombosis of the portal veins, portal confluence, SMV, upper IVM, and the distal splenic vein  - LFTs, Lipase normal   - c/w Heparin drip  - Diet: NPO for now  - General surgery and vascular surgery consulted  - QMA consulted

## 2023-05-22 NOTE — H&P ADULT - ASSESSMENT
53 yrs old M, previous heavy alcohol drinker and smoker, pshx of s/p  bilateral varicose veins surgery and Rt leg plate after fx, presented with increased post-prandial bloating. Pt is admitted for portal vein thrombosis and suspected hepatic malignancy.

## 2023-05-22 NOTE — CONSULT NOTE ADULT - SUBJECTIVE AND OBJECTIVE BOX
Surgery called to see and evaluate 53 year old male for CT findings concerning for mesenteric ischemia. Patient seen and examined in the ED with daughter. Patient deferred using  and preferred speaking with the assistance of his daughter at the bedside.   Patient coming in with 1 week history of abdominal distention, specially when he eats. He states that he has been having abdominal pain for the past month. He states that 1 month ago he got a colonoscopy and other than gallstones nothing else was abnormal during the workup.  He states that he was able to tolerate his breakfast this morning, denies any N/V. Denies any BRBPR. States that he had a bowel movement this morning that was normal in caliber, consistency and color. He states that he felt feverish last night.  He states that he felt fatigue after his bowel movement this morning and came to the ED.   Currently denies fever, chills, sob or any other constitutional symptoms.   He denies any abdominal surgical history. He denies any past medical history. Denies any family history of any clots.   He was seen at [facility] on 9/2022 and seems to have had b/l LE ablation for varicose veins.   He denies any smoking or drinking history. He denies any recent weight loss. He states that he ambulates at home without assistance.    Surgery called to see and evaluate 53 year old male for CT findings concerning for mesenteric ischemia. Patient seen and examined in the ED with daughter. Patient deferred using  and preferred speaking with the assistance of his daughter at the bedside.   Patient coming in with 1 week history of abdominal distention, specfically when he eats. He states that he has been having abdominal pain for the past month. He states that 1 month ago he got a colonoscopy and other than gallstones nothing else was abnormal during the workup.  He states that he was able to tolerate his breakfast this morning, denies any N/V. Denies any BRBPR. States that he had a bowel movement this morning that was normal in caliber, consistency and color. He states that he felt feverish last night.  He states that he felt fatigue after his bowel movement this morning and came to the ED.   Currently denies fever, chills, sob or any other constitutional symptoms.   He denies any abdominal surgical history. He denies any past medical history. Denies any family history of any clots.   He was seen at [facility] on 2022 and seems to have had b/l LE ablation for varicose veins.   He denies any smoking or drinking history. He denies any recent weight loss. He states that he ambulates at home without assistance.       PAST MEDICAL & SURGICAL HISTORY:  No pertinent past medical history        MEDICATIONS  (STANDING):  heparin   Injectable 8000 Unit(s) IV Push once  heparin  Infusion.  Unit(s)/Hr (18 mL/Hr) IV Continuous <Continuous>    MEDICATIONS  (PRN):  heparin   Injectable 8000 Unit(s) IV Push every 6 hours PRN For aPTT less than 40  heparin   Injectable 4000 Unit(s) IV Push every 6 hours PRN For aPTT between 40 - 57      Allergies    No Known Allergies    Intolerances        Vital Signs Last 24 Hrs  T(C): 36.7 (22 May 2023 08:42), Max: 36.7 (22 May 2023 08:42)  T(F): 98 (22 May 2023 08:42), Max: 98 (22 May 2023 08:42)  HR: 77 (22 May 2023 08:42) (77 - 77)  BP: 122/84 (22 May 2023 08:42) (122/84 - 122/84)  BP(mean): --  RR: 18 (22 May 2023 08:42) (18 - 18)  SpO2: 97% (22 May 2023 08:42) (97% - 97%)    Parameters below as of 22 May 2023 08:42  Patient On (Oxygen Delivery Method): room air        Physical:  Gen: A&Ox3. NAD  Respirations: nonlabored   Abd: Soft, nondistended, nontender, no pulsatile masses   Rectum: no external hemorroids or lesions, good anal sphincter tone, trace liquid brown stool present in vault  Ext: +palpable dp pulse, nontender b/l, nonedematous b/l      I&O's Detail      LABS:                        14.0   6.28  )-----------( 211      ( 22 May 2023 09:59 )             42.2              05    140  |  112<H>  |  14  ----------------------------<  91  4.0   |  25  |  0.82    Ca    9.2      22 May 2023 09:59    TPro  7.4  /  Alb  3.2<L>  /  TBili  0.6  /  DBili  x   /  AST  21  /  ALT  39  /  AlkPhos  76              PT/INR - ( 22 May 2023 12:33 )   PT: 15.1 sec;   INR: 1.27 ratio         PTT - ( 22 May 2023 12:33 )  PTT:30.4 sec  Urinalysis Basic - ( 22 May 2023 09:59 )    Color: Yellow / Appearance: Clear / S.015 / pH: x  Gluc: x / Ketone: Negative  / Bili: Negative / Urobili: Negative   Blood: x / Protein: 15 mg/dL / Nitrite: Negative   Leuk Esterase: Negative / RBC: 0-2 /HPF / WBC 0-2 /HPF   Sq Epi: x / Non Sq Epi: x / Bacteria: x        RADIOLOGY & ADDITIONAL STUDIES:    < from: CT Abdomen and Pelvis w/ IV Cont (23 @ 11:08) >  INDINGS:  LOWER CHEST: Within normal limits.    LIVER: Small hypodense lesion in the right hepatic lobe, too small to   characterize. Mild heterogeneous enhancement pattern of the liver.  BILE DUCTS: Normal caliber.  GALLBLADDER: Cholelithiasis. No evidence for thickened gallbladder wall   or pericholecystic fluid.  SPLEEN: Within normal limits.  PANCREAS: Within normal limits.  ADRENALS: Within normal limits.  KIDNEYS/URETERS: Within normal limits except for a few small hypodense   lesions in the left kidney, too small to characterize.    BLADDER: Within normal limits.  REPRODUCTIVE ORGANS: The prostate and seminal vesicles appear grossly   unremarkable.    BOWEL: No bowel obstruction. Appendix within normal limits.  PERITONEUM: No free air.Mesenteric edema and trace mesenteric ascites.  VESSELS: Filling defects are seen in the left portal vein, right portal   vein, main portal vein, portal confluence, SMV, upper IMV, and distal   splenic vein, likely representing thrombi. Associated varices. Moderate   stenosis at the proximal left internal iliac artery, likely due to   atherosclerotic disease.  RETROPERITONEUM/LYMPH NODES: No lymphadenopathy.  ABDOMINAL WALL: Small fat-containing umbilical hernia. Mild   fat-containing inguinal hernias bilaterally.  BONES: Degenerative spondylosis.    IMPRESSION:  Extensive thrombosis of the portal veins, portal confluence, SMV, upper   IVM, and the distal splenic vein. Associated varices. Associated   heterogeneous enhancement pattern of the liver. Mesenteric edema and   trace mesenteric ascites for which associated bowel ischemia is   considered. Clinical correlation is recommended.    Cholelithiasis. No evidence for thickened gallbladder wall or   pericholecystic fluid.    < end of copied text >       53 y.o. Male w. CT findings of mesenteric edema and trace mesenteric ascites for which associated bowel ischemia is considered  CT: Extensive thrombosis of the portal veins, portal confluence, SMV, upper IVM, and the distal splenic vein. Associated varices. Associated   heterogeneous enhancement pattern of the liver.   afebrile, vitals wnl, wbc wnl, lactate wnl, lft wnl     PATIENT SEEN   PLAN PENDING  Surgery called to see and evaluate 53 year old male for CT findings concerning for mesenteric ischemia. Patient seen and examined in the ED with daughter. Patient deferred using  and preferred speaking with the assistance of his daughter at the bedside.   Patient coming in with 1 week history of abdominal distention, specfically when he eats. He states that he has been having abdominal pain for the past month. He states that 1 month ago he got a colonoscopy and other than gallstones nothing else was abnormal during the workup.  He states that he was able to tolerate his breakfast this morning, denies any N/V. Denies any BRBPR. States that he had a bowel movement this morning that was normal in caliber, consistency and color. He states that he felt feverish last night.  He states that he felt fatigue after his bowel movement this morning and came to the ED.   Currently denies fever, chills, sob or any other constitutional symptoms.   He denies any abdominal surgical history. He denies any past medical history. Denies any family history of any clots.   He was seen at [facility] on [facility]22 and seems to have had b/l LE ablation for varicose veins.   He denies any smoking or drinking history. He denies any recent weight loss. He states that he ambulates at home without assistance.       PAST MEDICAL & SURGICAL HISTORY:  No pertinent past medical history        MEDICATIONS  (STANDING):  heparin   Injectable 8000 Unit(s) IV Push once  heparin  Infusion.  Unit(s)/Hr (18 mL/Hr) IV Continuous <Continuous>    MEDICATIONS  (PRN):  heparin   Injectable 8000 Unit(s) IV Push every 6 hours PRN For aPTT less than 40  heparin   Injectable 4000 Unit(s) IV Push every 6 hours PRN For aPTT between 40 - 57      Allergies    No Known Allergies    Intolerances        Vital Signs Last 24 Hrs  T(C): 36.7 (22 May 2023 08:42), Max: 36.7 (22 May 2023 08:42)  T(F): 98 (22 May 2023 08:42), Max: 98 (22 May 2023 08:42)  HR: 77 (22 May 2023 08:42) (77 - 77)  BP: 122/84 (22 May 2023 08:42) (122/84 - 122/84)  BP(mean): --  RR: 18 (22 May 2023 08:42) (18 - 18)  SpO2: 97% (22 May 2023 08:42) (97% - 97%)    Parameters below as of 22 May 2023 08:42  Patient On (Oxygen Delivery Method): room air        Physical:  Gen: A&Ox3. NAD  Respirations: nonlabored   Abd: Soft, nondistended, nontender, no pulsatile masses   Rectum: no external hemorroids or lesions, good anal sphincter tone, trace liquid brown stool present in vault  Ext: +palpable dp pulse, nontender b/l, nonedematous b/l      I&O's Detail      LABS:                        14.0   6.28  )-----------( 211      ( 22 May 2023 09:59 )             42.2              05    140  |  112<H>  |  14  ----------------------------<  91  4.0   |  25  |  0.82    Ca    9.2      22 May 2023 09:59    TPro  7.4  /  Alb  3.2<L>  /  TBili  0.6  /  DBili  x   /  AST  21  /  ALT  39  /  AlkPhos  76              PT/INR - ( 22 May 2023 12:33 )   PT: 15.1 sec;   INR: 1.27 ratio         PTT - ( 22 May 2023 12:33 )  PTT:30.4 sec  Urinalysis Basic - ( 22 May 2023 09:59 )    Color: Yellow / Appearance: Clear / S.015 / pH: x  Gluc: x / Ketone: Negative  / Bili: Negative / Urobili: Negative   Blood: x / Protein: 15 mg/dL / Nitrite: Negative   Leuk Esterase: Negative / RBC: 0-2 /HPF / WBC 0-2 /HPF   Sq Epi: x / Non Sq Epi: x / Bacteria: x        RADIOLOGY & ADDITIONAL STUDIES:    < from: CT Abdomen and Pelvis w/ IV Cont (23 @ 11:08) >  INDINGS:  LOWER CHEST: Within normal limits.    LIVER: Small hypodense lesion in the right hepatic lobe, too small to   characterize. Mild heterogeneous enhancement pattern of the liver.  BILE DUCTS: Normal caliber.  GALLBLADDER: Cholelithiasis. No evidence for thickened gallbladder wall   or pericholecystic fluid.  SPLEEN: Within normal limits.  PANCREAS: Within normal limits.  ADRENALS: Within normal limits.  KIDNEYS/URETERS: Within normal limits except for a few small hypodense   lesions in the left kidney, too small to characterize.    BLADDER: Within normal limits.  REPRODUCTIVE ORGANS: The prostate and seminal vesicles appear grossly   unremarkable.    BOWEL: No bowel obstruction. Appendix within normal limits.  PERITONEUM: No free air.Mesenteric edema and trace mesenteric ascites.  VESSELS: Filling defects are seen in the left portal vein, right portal   vein, main portal vein, portal confluence, SMV, upper IMV, and distal   splenic vein, likely representing thrombi. Associated varices. Moderate   stenosis at the proximal left internal iliac artery, likely due to   atherosclerotic disease.  RETROPERITONEUM/LYMPH NODES: No lymphadenopathy.  ABDOMINAL WALL: Small fat-containing umbilical hernia. Mild   fat-containing inguinal hernias bilaterally.  BONES: Degenerative spondylosis.    IMPRESSION:  Extensive thrombosis of the portal veins, portal confluence, SMV, upper   IVM, and the distal splenic vein. Associated varices. Associated   heterogeneous enhancement pattern of the liver. Mesenteric edema and   trace mesenteric ascites for which associated bowel ischemia is   considered. Clinical correlation is recommended.    Cholelithiasis. No evidence for thickened gallbladder wall or   pericholecystic fluid.    < end of copied text >

## 2023-05-22 NOTE — ED ADULT NURSE NOTE - OBJECTIVE STATEMENT
As per pt, c/o generalized ABD pain and bloating w/ SOB after eating x10 days. Pt denies all other symptoms. PT reports, seeing his PCP and was told he has gallstones. and refused the SGY to remove them.

## 2023-05-23 LAB
AFP-TM SERPL-MCNC: 2 NG/ML — SIGNIFICANT CHANGE UP
ANION GAP SERPL CALC-SCNC: 6 MMOL/L — SIGNIFICANT CHANGE UP (ref 5–17)
APTT BLD: 38.1 SEC — HIGH (ref 27.5–35.5)
BUN SERPL-MCNC: 12 MG/DL — SIGNIFICANT CHANGE UP (ref 7–18)
CALCIUM SERPL-MCNC: 9.1 MG/DL — SIGNIFICANT CHANGE UP (ref 8.4–10.5)
CANCER AG19-9 SERPL-ACNC: 4 U/ML — SIGNIFICANT CHANGE UP
CEA SERPL-MCNC: 2.3 NG/ML — SIGNIFICANT CHANGE UP (ref 0–3.8)
CHLORIDE SERPL-SCNC: 110 MMOL/L — HIGH (ref 96–108)
CO2 SERPL-SCNC: 24 MMOL/L — SIGNIFICANT CHANGE UP (ref 22–31)
CREAT SERPL-MCNC: 0.78 MG/DL — SIGNIFICANT CHANGE UP (ref 0.5–1.3)
EGFR: 105 ML/MIN/1.73M2 — SIGNIFICANT CHANGE UP
GLUCOSE SERPL-MCNC: 83 MG/DL — SIGNIFICANT CHANGE UP (ref 70–99)
HCT VFR BLD CALC: 43.1 % — SIGNIFICANT CHANGE UP (ref 39–50)
HGB BLD-MCNC: 14.4 G/DL — SIGNIFICANT CHANGE UP (ref 13–17)
MAGNESIUM SERPL-MCNC: 2.3 MG/DL — SIGNIFICANT CHANGE UP (ref 1.6–2.6)
MCHC RBC-ENTMCNC: 30.4 PG — SIGNIFICANT CHANGE UP (ref 27–34)
MCHC RBC-ENTMCNC: 33.4 GM/DL — SIGNIFICANT CHANGE UP (ref 32–36)
MCV RBC AUTO: 90.9 FL — SIGNIFICANT CHANGE UP (ref 80–100)
NRBC # BLD: 0 /100 WBCS — SIGNIFICANT CHANGE UP (ref 0–0)
PHOSPHATE SERPL-MCNC: 3.5 MG/DL — SIGNIFICANT CHANGE UP (ref 2.5–4.5)
PLATELET # BLD AUTO: 197 K/UL — SIGNIFICANT CHANGE UP (ref 150–400)
POTASSIUM SERPL-MCNC: 3.9 MMOL/L — SIGNIFICANT CHANGE UP (ref 3.5–5.3)
POTASSIUM SERPL-SCNC: 3.9 MMOL/L — SIGNIFICANT CHANGE UP (ref 3.5–5.3)
PSA FLD-MCNC: 0.9 NG/ML — SIGNIFICANT CHANGE UP (ref 0–4)
RBC # BLD: 4.74 M/UL — SIGNIFICANT CHANGE UP (ref 4.2–5.8)
RBC # FLD: 11.9 % — SIGNIFICANT CHANGE UP (ref 10.3–14.5)
SODIUM SERPL-SCNC: 140 MMOL/L — SIGNIFICANT CHANGE UP (ref 135–145)
WBC # BLD: 5.75 K/UL — SIGNIFICANT CHANGE UP (ref 3.8–10.5)
WBC # FLD AUTO: 5.75 K/UL — SIGNIFICANT CHANGE UP (ref 3.8–10.5)

## 2023-05-23 PROCEDURE — 99232 SBSQ HOSP IP/OBS MODERATE 35: CPT | Mod: GC

## 2023-05-23 PROCEDURE — 99232 SBSQ HOSP IP/OBS MODERATE 35: CPT

## 2023-05-23 RX ORDER — SODIUM CHLORIDE 9 MG/ML
1000 INJECTION INTRAMUSCULAR; INTRAVENOUS; SUBCUTANEOUS
Refills: 0 | Status: DISCONTINUED | OUTPATIENT
Start: 2023-05-23 | End: 2023-05-25

## 2023-05-23 RX ADMIN — SODIUM CHLORIDE 125 MILLILITER(S): 9 INJECTION INTRAMUSCULAR; INTRAVENOUS; SUBCUTANEOUS at 21:39

## 2023-05-23 RX ADMIN — ENOXAPARIN SODIUM 100 MILLIGRAM(S): 100 INJECTION SUBCUTANEOUS at 05:24

## 2023-05-23 RX ADMIN — SODIUM CHLORIDE 100 MILLILITER(S): 9 INJECTION INTRAMUSCULAR; INTRAVENOUS; SUBCUTANEOUS at 05:24

## 2023-05-23 RX ADMIN — SODIUM CHLORIDE 125 MILLILITER(S): 9 INJECTION INTRAMUSCULAR; INTRAVENOUS; SUBCUTANEOUS at 11:26

## 2023-05-23 RX ADMIN — ENOXAPARIN SODIUM 100 MILLIGRAM(S): 100 INJECTION SUBCUTANEOUS at 17:07

## 2023-05-23 NOTE — PROGRESS NOTE ADULT - ASSESSMENT
53M with mesenteric edema and trace mesenteric ascites concerning for bowel ischemia with CT findings of extensive thrombosis of the portal veins, portal confluence, SMV, upper IVM, and the distal splenic vein  clinically improving    - trend lactate  - serial abdominal exams  - continue anticoagulation per vascular/heme-onc recs  - discussed with Dr. Hadley

## 2023-05-23 NOTE — PROGRESS NOTE ADULT - ASSESSMENT
complete note to follow    #VTE Prophylaxis     Assessment and Recommendation:   · Assessment	    53 yrs old M, previous heavy alcohol drinker and smoker, pshx of s/p  bilateral varicose veins surgery and Rt leg plate after fx, presented with increased post-prandial bloating. Pt reported that he has been having bloating after food for the past 10 days which has increased in intensity however for the past day it got worse and was associated with hyperventilation, subjective fever. Pt stated that he was not able to bend forward, Pt denied nausea, vomiting, diarrhea, weight loss, changes in the bowel habit, hematemesis, hematochezia, or dark stools.   Pt was a heavy alcohol drink and smoked one pack of cigarettes daily for 22 yrs however he has quit both for the past 8 yrs. He exercises daily and uses pre-workout pills and protein powder post workout. Family hx is only significant for DM in father. Pt denied any medications including over the counter and herbal.   He had a recent colonoscopy about one month ago which showed a benign 7 mm polyp in the transverse colon    #Abdominal Thrombosis  p/w post-prandial abdominal distention and ? fever, denies wt loss  4/3 colonoscopy with small polyp  denies hx of VTE or malignancy no FamHx of VTE/malignancy  CBC nl  mild hypoalbuminemia  CT A/P shows: Extensive thrombosis of the portal veins, portal confluence, SMV, upper IVM, and the distal splenic vein. Associated varices. Associated heterogeneous enhancement pattern of the liver. Mesenteric edema and trace mesenteric ascites for which associated bowel ischemia is considered. small hypodense liver lesion  Rec's:  -Chest CT (-)  -MRI liver  -TM's are all nl  -APLA and MPN panel are pending  -Check Hepatitis panel, HIV, LDH  -continue full-dose lovenox  -may change to DOAC if no plan for procedures  -surgery following for mesenteric edema and poss bowel ischemia, pt is NPO  further recommendations pending above    Thank you for the referral. Will continue to monitor the patient.  Please call with any questions [phone]  Above reviewed with Attending Dr. Nikunj MARTINEZ/[facility]  [address] [facility], [address], [place], [place]  [phone]  *Note not finalized until signed by Attending Physician

## 2023-05-23 NOTE — PROGRESS NOTE ADULT - PROBLEM SELECTOR PLAN 1
No hx of cancer, weight loss, or other constitutional symptoms,   Pt quit smoking and drinking for the past 8 yrs, no hx of cirrhosis   ? Hepatic malignancy vs hypercoagulable state vs Budd- Chiari syndrome    CT abd and pelvis w IV contrast: noted - Extensive thrombosis of the portal veins, portal confluence, SMV, upper IVM, and the distal splenic vein  - LFTs, Lipase normal   - c/w lovenox 100mg BID   - Diet: clears   - General surgery and vascular surgery consulted- no surgical intervention for now   - QMA consulted

## 2023-05-23 NOTE — PROGRESS NOTE ADULT - SUBJECTIVE AND OBJECTIVE BOX
Patient is a 53y old  Male who presents with a chief complaint of Portal vein thrombosis and suspected malignancy       SUBJECTIVE / OVERNIGHT EVENTS:      MEDICATIONS  (STANDING):  enoxaparin Injectable 100 milliGRAM(s) SubCutaneous every 12 hours  sodium chloride 0.9%. 1000 milliLiter(s) (125 mL/Hr) IV Continuous <Continuous>    MEDICATIONS  (PRN):    CAPILLARY BLOOD GLUCOSE        I&O's Summary      PHYSICAL EXAM:  Vital Signs Last 24 Hrs  T(C): 37.1 (23 May 2023 05:23), Max: 37.1 (22 May 2023 21:45)  T(F): 98.7 (23 May 2023 05:23), Max: 98.7 (22 May 2023 21:45)  HR: 70 (23 May 2023 05:23) (70 - 76)  BP: 99/55 (23 May 2023 05:23) (99/55 - 122/85)  BP(mean): --  RR: 17 (23 May 2023 05:23) (17 - 18)  SpO2: 98% (23 May 2023 05:23) (95% - 98%)    Parameters below as of 23 May 2023 05:23  Patient On (Oxygen Delivery Method): room air        LABS:                        14.4   5.75  )-----------( 197      ( 23 May 2023 06:55 )             43.1     -23    140  |  110<H>  |  12  ----------------------------<  83  3.9   |  24  |  0.78    Ca    9.1      23 May 2023 06:55  Phos  3.5       Mg     2.3         TPro  7.4  /  Alb  3.2<L>  /  TBili  0.6  /  DBili  x   /  AST  21  /  ALT  39  /  AlkPhos  76  05-22    PT/INR - ( 22 May 2023 12:33 )   PT: 15.1 sec;   INR: 1.27 ratio         PTT - ( 23 May 2023 06:55 )  PTT:38.1 sec      Urinalysis Basic - ( 22 May 2023 09:59 )    Color: Yellow / Appearance: Clear / S.015 / pH: x  Gluc: x / Ketone: Negative  / Bili: Negative / Urobili: Negative   Blood: x / Protein: 15 mg/dL / Nitrite: Negative   Leuk Esterase: Negative / RBC: 0-2 /HPF / WBC 0-2 /HPF   Sq Epi: x / Non Sq Epi: x / Bacteria: x            RADIOLOGY & ADDITIONAL TESTS:       Patient is a 53y old  Male who presents with a chief complaint of Portal vein thrombosis and suspected malignancy       SUBJECTIVE / OVERNIGHT EVENTS: events noted. P states he is hungry. He denies abdominal pain, N/V. Abdominal distention improved as pt describes bloating occurs postprandial and pt is NPO. Colonoscopy 4/3 showed small polyp, never had EGD  daughter at bed side      MEDICATIONS  (STANDING):  enoxaparin Injectable 100 milliGRAM(s) SubCutaneous every 12 hours  sodium chloride 0.9%. 1000 milliLiter(s) (125 mL/Hr) IV Continuous <Continuous>    MEDICATIONS  (PRN):    CAPILLARY BLOOD GLUCOSE        I&O's Summary      PHYSICAL EXAM:  Vital Signs Last 24 Hrs  T(C): 37.1 (23 May 2023 05:23), Max: 37.1 (22 May 2023 21:45)  T(F): 98.7 (23 May 2023 05:23), Max: 98.7 (22 May 2023 21:45)  HR: 70 (23 May 2023 05:23) (70 - 76)  BP: 99/55 (23 May 2023 05:23) (99/55 - 122/85)  BP(mean): --  RR: 17 (23 May 2023 05:23) (17 - 18)  SpO2: 98% (23 May 2023 05:23) (95% - 98%)    Parameters below as of 23 May 2023 05:23  Patient On (Oxygen Delivery Method): room air      GEN: NAD; A and O x 3  LUNGS: CTA B/L  HEART: S1 S2  ABDOMEN: soft, non-tender, non-distended, + BS  EXTREMITIES: clubbing  NERVOUS SYSTEM:  Awake and alert; no focal neuro deficits        LABS:                        14.4   5.75  )-----------( 197      ( 23 May 2023 06:55 )             43.1     05-    140  |  110<H>  |  12  ----------------------------<  83  3.9   |  24  |  0.78    Ca    9.1      23 May 2023 06:55  Phos  3.5     05-  Mg     2.3         TPro  7.4  /  Alb  3.2<L>  /  TBili  0.6  /  DBili  x   /  AST  21  /  ALT  39  /  AlkPhos  76  05-    PT/INR - ( 22 May 2023 12:33 )   PT: 15.1 sec;   INR: 1.27 ratio         PTT - ( 23 May 2023 06:55 )  PTT:38.1 sec      Urinalysis Basic - ( 22 May 2023 09:59 )    Color: Yellow / Appearance: Clear / S.015 / pH: x  Gluc: x / Ketone: Negative  / Bili: Negative / Urobili: Negative   Blood: x / Protein: 15 mg/dL / Nitrite: Negative   Leuk Esterase: Negative / RBC: 0-2 /HPF / WBC 0-2 /HPF   Sq Epi: x / Non Sq Epi: x / Bacteria: x            RADIOLOGY & ADDITIONAL TESTS:  < from: CT Chest No Cont (23 @ 17:18) >    ACC: [id number] EXAM:  CT CHEST   ORDERED BY: RYLIE KAUR     PROCEDURE DATE:  2023          INTERPRETATION:  INDICATION: r/o malignancy, portal vein thrombosis. 53   yrs old M w/ PMH of EtOH use, heavy smoker, s/p  bilateral varicose veins   surgery and  Rt leg plate after fx p/w ab pain/bloating and found w/ portal vein   thrombus.  TECHNIQUE: Unenhanced CT of the chest. Coronal, sagittal, and MIP images   were reconstructed and reviewed.  COMPARISON: No prior chest CT.    FINDINGS:    AIRWAYS, LUNGS, PLEURA: Patent central airways. Clear lungs. No   suspicious nodule. No pleural effusion.    LYMPH NODES, MEDIASTINUM: No lymphadenopathy.    HEART, VESSELS: Heart size is normal. No pericardial effusion. Thoracic   aorta and pulmonary artery normal in diameter.    VISUALIZED UPPER ABDOMEN: Within normal limits.    CHEST WALL, BONES: No aggressive osseous lesion. Small subcutaneous   metallic density within anterior chest wall (series 4 image 88).    LOWER NECK: Within normal limits.    IMPRESSION:    No evidence of malignancy or metastatic disease.    < end of copied text >

## 2023-05-23 NOTE — PROGRESS NOTE ADULT - SUBJECTIVE AND OBJECTIVE BOX
S: No acute complaints. Denies abdominal pain, nausea or vomiting. Denies diarrhea or bloody stools; reports some mucus. Has been receiving therapeutic Lovenox.  Lactate 0.9    Vital Signs Last 24 Hrs  T(C): 37.1 (23 May 2023 05:23), Max: 37.1 (22 May 2023 21:45)  T(F): 98.7 (23 May 2023 05:23), Max: 98.7 (22 May 2023 21:45)  HR: 70 (23 May 2023 05:23) (70 - 77)  BP: 99/55 (23 May 2023 05:23) (99/55 - 122/85)  BP(mean): --  RR: 17 (23 May 2023 05:23) (17 - 18)  SpO2: 98% (23 May 2023 05:23) (95% - 98%)    Parameters below as of 23 May 2023 05:23  Patient On (Oxygen Delivery Method): room air    MEDICATIONS  (STANDING):  enoxaparin Injectable 100 milliGRAM(s) SubCutaneous every 12 hours  sodium chloride 0.9%. 1000 milliLiter(s) (100 mL/Hr) IV Continuous <Continuous>    Exam:   General: NAD, alert, awake  Resp: nonlabored  Abdomen: soft, NT, ND, no guarding     LABS:                      14.4   5.75  )-----------( 197      ( 23 May 2023 06:55 )             43.1     05-23  140  |  110<H>  |  12  ----------------------------<  83  3.9   |  24  |  0.78    Ca    9.1      23 May 2023 06:55  Phos  3.5     05-23  Mg     2.3     05-23    TPro  7.4  /  Alb  3.2<L>  /  TBili  0.6  /  DBili  x   /  AST  21  /  ALT  39  /  AlkPhos  76  05-22    Lactate, Blood (05.22.23 @ 20:04)    Lactate, Blood: 0.9: Collection date/time has been modified to: MAY/22/23 20:04:00.  Previous  collection date/time: MAY/22/23 07:55:00. mmol/L

## 2023-05-23 NOTE — PROGRESS NOTE ADULT - PROBLEM SELECTOR PLAN 2
No hx of cancer, weight loss, or other constitutional symptoms,   Pt quit smoking and drinking for the past 8 yrs, no hx of cirrhosis  CT abd and pelvis w IV contrast: Small hypodense lesion in the right hepatic lobe, too small to characterize. Mild heterogeneous enhancement pattern of the liver.  - tumor markers AFP, CEA, - negative   - QMA consulted

## 2023-05-23 NOTE — PROGRESS NOTE ADULT - SUBJECTIVE AND OBJECTIVE BOX
PGY-1 Progress Note discussed with attending    PLEASE CONTACT ON CALL TEAM:  - On Call Team (Please refer to Rufus) FROM 5:00 PM - 8:30PM  - Nightfloat Team FROM 8:30 -7:30 AM    INTERVAL HPI/OVERNIGHT EVENTS: No acute events overnight. Denies abdominal pain, only has pain when she eats. diet advanced to clears. no surgery at this time. pending mri.   MEDICATIONS  (STANDING):  enoxaparin Injectable 100 milliGRAM(s) SubCutaneous every 12 hours  sodium chloride 0.9%. 1000 milliLiter(s) (125 mL/Hr) IV Continuous <Continuous>    MEDICATIONS  (PRN):      REVIEW OF SYSTEMS:  CONSTITUTIONAL: No fever, weight loss, or fatigue  RESPIRATORY: No cough, wheezing, chills or hemoptysis; No shortness of breath  CARDIOVASCULAR: No chest pain, palpitations, dizziness, or leg swelling  GASTROINTESTINAL: No abdominal pain. No nausea, vomiting, or hematemesis; No diarrhea or constipation. No melena or hematochezia.  GENITOURINARY: No dysuria or hematuria, urinary frequency  NEUROLOGICAL: No headaches, memory loss, loss of strength, numbness, or tremors  SKIN: No itching, burning, rashes, or lesions     Vital Signs Last 24 Hrs  T(C): 36.7 (23 May 2023 13:32), Max: 37.1 (22 May 2023 21:45)  T(F): 98 (23 May 2023 13:32), Max: 98.7 (22 May 2023 21:45)  HR: 74 (23 May 2023 13:32) (70 - 75)  BP: 109/66 (23 May 2023 13:32) (99/55 - 109/66)  BP(mean): --  RR: 18 (23 May 2023 13:32) (17 - 18)  SpO2: 95% (23 May 2023 13:32) (95% - 98%)    Parameters below as of 23 May 2023 13:32  Patient On (Oxygen Delivery Method): room air        PHYSICAL EXAMINATION:  GENERAL: NAD, well built  HEAD:  Atraumatic, Normocephalic  EYES:  conjunctiva and sclera clear  NECK: Supple, No JVD, Normal thyroid  CHEST/LUNG: Clear to auscultation. Clear to percussion bilaterally; No rales, rhonchi, wheezing, or rubs  HEART: Regular rate and rhythm; No murmurs, rubs, or gallops  ABDOMEN: Soft, Nontender, Nondistended; Bowel sounds present  NERVOUS SYSTEM:  Alert & Oriented X3,    EXTREMITIES:  2+ Peripheral Pulses, No clubbing, cyanosis, or edema  SKIN: warm dry                          14.4   5.75  )-----------( 197      ( 23 May 2023 06:55 )             43.1     05-23    140  |  110<H>  |  12  ----------------------------<  83  3.9   |  24  |  0.78    Ca    9.1      23 May 2023 06:55  Phos  3.5     05-23  Mg     2.3     05-23    TPro  7.4  /  Alb  3.2<L>  /  TBili  0.6  /  DBili  x   /  AST  21  /  ALT  39  /  AlkPhos  76  05-22    LIVER FUNCTIONS - ( 22 May 2023 09:59 )  Alb: 3.2 g/dL / Pro: 7.4 g/dL / ALK PHOS: 76 U/L / ALT: 39 U/L DA / AST: 21 U/L / GGT: x               PT/INR - ( 22 May 2023 12:33 )   PT: 15.1 sec;   INR: 1.27 ratio         PTT - ( 23 May 2023 06:55 )  PTT:38.1 sec    CAPILLARY BLOOD GLUCOSE      RADIOLOGY & ADDITIONAL TESTS:

## 2023-05-23 NOTE — PROGRESS NOTE ADULT - ASSESSMENT
53 year old male presenting with abdominal pain and bloating after eating for the past 10 days found to have extensive thrombosis of portal veins, portal confluence, SMV, upper IMV, distal splenic vein, and associated varices.   Also noted to have mesenteric edema and trace mesenteric ascites  Labs and vital signs stable     -diet as per gen surg   -continue full dose anticoagulation  -heme/onc recs appreciated   -serial abdominal exams   -discussed with Dr. Barrett

## 2023-05-23 NOTE — PROGRESS NOTE ADULT - SUBJECTIVE AND OBJECTIVE BOX
INTERVAL HPI/OVERNIGHT EVENTS:  Seen and examined with attending   Pt resting comfortably. No acute complaints. Denies abdominal pain at time of exam   Tolerating diet.     MEDICATIONS  (STANDING):  enoxaparin Injectable 100 milliGRAM(s) SubCutaneous every 12 hours  sodium chloride 0.9%. 1000 milliLiter(s) (125 mL/Hr) IV Continuous <Continuous>    MEDICATIONS  (PRN):      Vital Signs Last 24 Hrs  T(C): 36.7 (23 May 2023 13:32), Max: 37.1 (22 May 2023 21:45)  T(F): 98 (23 May 2023 13:32), Max: 98.7 (22 May 2023 21:45)  HR: 74 (23 May 2023 13:32) (70 - 75)  BP: 109/66 (23 May 2023 13:32) (99/55 - 109/66)  BP(mean): --  RR: 18 (23 May 2023 13:32) (17 - 18)  SpO2: 95% (23 May 2023 13:32) (95% - 98%)    Parameters below as of 23 May 2023 13:32  Patient On (Oxygen Delivery Method): room air        Physical:  General: A&Ox3. NAD.  Respirations: Unlabored   Abdomen: Soft nondistended, nontender. No rebound, no guarding, no peritonitis     I&O's Detail      LABS:                        14.4   5.75  )-----------( 197      ( 23 May 2023 06:55 )             43.1             05-23    140  |  110<H>  |  12  ----------------------------<  83  3.9   |  24  |  0.78    Ca    9.1      23 May 2023 06:55  Phos  3.5     05-23  Mg     2.3     05-23    TPro  7.4  /  Alb  3.2<L>  /  TBili  0.6  /  DBili  x   /  AST  21  /  ALT  39  /  AlkPhos  76  05-22

## 2023-05-24 LAB
ALBUMIN SERPL ELPH-MCNC: 3 G/DL — LOW (ref 3.5–5)
ALP SERPL-CCNC: 67 U/L — SIGNIFICANT CHANGE UP (ref 40–120)
ALT FLD-CCNC: 34 U/L DA — SIGNIFICANT CHANGE UP (ref 10–60)
ANION GAP SERPL CALC-SCNC: 6 MMOL/L — SIGNIFICANT CHANGE UP (ref 5–17)
APTT 50/50 2HOUR INCUB: SIGNIFICANT CHANGE UP (ref 27.5–36.5)
APTT BLD: 33.5 SEC — SIGNIFICANT CHANGE UP (ref 27.5–35.5)
APTT BLD: 34.9 SEC — SIGNIFICANT CHANGE UP (ref 27.5–35.5)
APTT BLD: 36.1 SEC — HIGH (ref 27.5–35.5)
APTT BLD: SIGNIFICANT CHANGE UP (ref 27.5–36.5)
AST SERPL-CCNC: 20 U/L — SIGNIFICANT CHANGE UP (ref 10–40)
B2 GLYCOPROT1 AB SER QL: NEGATIVE — SIGNIFICANT CHANGE UP
BILIRUB SERPL-MCNC: 0.8 MG/DL — SIGNIFICANT CHANGE UP (ref 0.2–1.2)
BUN SERPL-MCNC: 11 MG/DL — SIGNIFICANT CHANGE UP (ref 7–18)
CALCIUM SERPL-MCNC: 9 MG/DL — SIGNIFICANT CHANGE UP (ref 8.4–10.5)
CANCER AG125 SERPL-ACNC: 3 U/ML — SIGNIFICANT CHANGE UP
CARDIOLIPIN AB SER-ACNC: NEGATIVE — SIGNIFICANT CHANGE UP
CHLORIDE SERPL-SCNC: 110 MMOL/L — HIGH (ref 96–108)
CO2 SERPL-SCNC: 23 MMOL/L — SIGNIFICANT CHANGE UP (ref 22–31)
CREAT SERPL-MCNC: 0.81 MG/DL — SIGNIFICANT CHANGE UP (ref 0.5–1.3)
EGFR: 104 ML/MIN/1.73M2 — SIGNIFICANT CHANGE UP
GLUCOSE SERPL-MCNC: 92 MG/DL — SIGNIFICANT CHANGE UP (ref 70–99)
HAV IGM SER-ACNC: SIGNIFICANT CHANGE UP
HBV CORE IGM SER-ACNC: SIGNIFICANT CHANGE UP
HBV SURFACE AG SER-ACNC: SIGNIFICANT CHANGE UP
HCT VFR BLD CALC: 42.7 % — SIGNIFICANT CHANGE UP (ref 39–50)
HCV AB S/CO SERPL IA: 0.12 S/CO — SIGNIFICANT CHANGE UP (ref 0–0.99)
HCV AB SERPL-IMP: SIGNIFICANT CHANGE UP
HGB BLD-MCNC: 14.3 G/DL — SIGNIFICANT CHANGE UP (ref 13–17)
HIV 1+2 AB+HIV1 P24 AG SERPL QL IA: SIGNIFICANT CHANGE UP
LDH SERPL L TO P-CCNC: 200 U/L — SIGNIFICANT CHANGE UP (ref 120–225)
MAGNESIUM SERPL-MCNC: 2.2 MG/DL — SIGNIFICANT CHANGE UP (ref 1.6–2.6)
MCHC RBC-ENTMCNC: 29.7 PG — SIGNIFICANT CHANGE UP (ref 27–34)
MCHC RBC-ENTMCNC: 33.5 GM/DL — SIGNIFICANT CHANGE UP (ref 32–36)
MCV RBC AUTO: 88.6 FL — SIGNIFICANT CHANGE UP (ref 80–100)
NRBC # BLD: 0 /100 WBCS — SIGNIFICANT CHANGE UP (ref 0–0)
PHOSPHATE SERPL-MCNC: 3.4 MG/DL — SIGNIFICANT CHANGE UP (ref 2.5–4.5)
PLATELET # BLD AUTO: 220 K/UL — SIGNIFICANT CHANGE UP (ref 150–400)
POTASSIUM SERPL-MCNC: 3.9 MMOL/L — SIGNIFICANT CHANGE UP (ref 3.5–5.3)
POTASSIUM SERPL-SCNC: 3.9 MMOL/L — SIGNIFICANT CHANGE UP (ref 3.5–5.3)
PROT SERPL-MCNC: 7.3 G/DL — SIGNIFICANT CHANGE UP (ref 6–8.3)
RBC # BLD: 4.82 M/UL — SIGNIFICANT CHANGE UP (ref 4.2–5.8)
RBC # FLD: 11.9 % — SIGNIFICANT CHANGE UP (ref 10.3–14.5)
SODIUM SERPL-SCNC: 139 MMOL/L — SIGNIFICANT CHANGE UP (ref 135–145)
WBC # BLD: 5.35 K/UL — SIGNIFICANT CHANGE UP (ref 3.8–10.5)
WBC # FLD AUTO: 5.35 K/UL — SIGNIFICANT CHANGE UP (ref 3.8–10.5)

## 2023-05-24 PROCEDURE — 99232 SBSQ HOSP IP/OBS MODERATE 35: CPT | Mod: GC

## 2023-05-24 PROCEDURE — G0452: CPT | Mod: 26

## 2023-05-24 PROCEDURE — 74183 MRI ABD W/O CNTR FLWD CNTR: CPT | Mod: 26

## 2023-05-24 PROCEDURE — 88189 FLOWCYTOMETRY/READ 16 & >: CPT

## 2023-05-24 RX ORDER — ACETAMINOPHEN 500 MG
650 TABLET ORAL EVERY 6 HOURS
Refills: 0 | Status: DISCONTINUED | OUTPATIENT
Start: 2023-05-24 | End: 2023-05-26

## 2023-05-24 RX ADMIN — Medication 30 MILLILITER(S): at 16:07

## 2023-05-24 RX ADMIN — ENOXAPARIN SODIUM 100 MILLIGRAM(S): 100 INJECTION SUBCUTANEOUS at 05:33

## 2023-05-24 RX ADMIN — ENOXAPARIN SODIUM 100 MILLIGRAM(S): 100 INJECTION SUBCUTANEOUS at 17:21

## 2023-05-24 RX ADMIN — SODIUM CHLORIDE 125 MILLILITER(S): 9 INJECTION INTRAMUSCULAR; INTRAVENOUS; SUBCUTANEOUS at 22:27

## 2023-05-24 RX ADMIN — Medication 650 MILLIGRAM(S): at 16:46

## 2023-05-24 RX ADMIN — Medication 650 MILLIGRAM(S): at 15:46

## 2023-05-24 RX ADMIN — SODIUM CHLORIDE 125 MILLILITER(S): 9 INJECTION INTRAMUSCULAR; INTRAVENOUS; SUBCUTANEOUS at 05:33

## 2023-05-24 RX ADMIN — SODIUM CHLORIDE 125 MILLILITER(S): 9 INJECTION INTRAMUSCULAR; INTRAVENOUS; SUBCUTANEOUS at 16:07

## 2023-05-24 NOTE — PROGRESS NOTE ADULT - NS PANP COMMENT GEN_ALL_CORE FT
# Portal Vein Thrombosis: - unprovoked; unclear etiology; reviewed CT chest; awaiting MRI of the liver  -continue w/  LMWH at 1 mg/kg q12; can transition to DOAC (i.e. apixaban loading dose followed by maintenance dose) if no surgical intervention is planned  -agree w/ malignancy work up as well as MPN and APLA and LDH; can f/up with outpt heme/onc    # Liver lesion - CT reviewed; awaiting MRI, liver protocol  WNL AFP / CEA; negative tumor markers     We will follow; case d/w resident physician

## 2023-05-24 NOTE — PROGRESS NOTE ADULT - SUBJECTIVE AND OBJECTIVE BOX
PGY-1 Progress Note discussed with attending    PAGER #: [[phone]] TILL 5:00 PM  PLEASE CONTACT ON CALL TEAM:  - On Call Team (Please refer to Rufus) FROM 5:00 PM - 8:30PM  - Nightfloat Team FROM 8:30 -7:30 AM    CHIEF COMPLAINT & BRIEF HOSPITAL COURSE:    INTERVAL HPI/OVERNIGHT EVENTS:   MEDICATIONS  (STANDING):  enoxaparin Injectable 100 milliGRAM(s) SubCutaneous every 12 hours  sodium chloride 0.9%. 1000 milliLiter(s) (125 mL/Hr) IV Continuous <Continuous>    MEDICATIONS  (PRN):      REVIEW OF SYSTEMS:  CONSTITUTIONAL: No fever, weight loss, or fatigue  RESPIRATORY: No cough, wheezing, chills or hemoptysis; No shortness of breath  CARDIOVASCULAR: No chest pain, palpitations, dizziness, or leg swelling  GASTROINTESTINAL: No abdominal pain. No nausea, vomiting, or hematemesis; No diarrhea or constipation. No melena or hematochezia.  GENITOURINARY: No dysuria or hematuria, urinary frequency  NEUROLOGICAL: No headaches, memory loss, loss of strength, numbness, or tremors  SKIN: No itching, burning, rashes, or lesions     Vital Signs Last 24 Hrs  T(C): 36.6 (24 May 2023 13:09), Max: 36.8 (23 May 2023 20:57)  T(F): 97.9 (24 May 2023 13:09), Max: 98.2 (23 May 2023 20:57)  HR: 68 (24 May 2023 13:09) (68 - 83)  BP: 113/65 (24 May 2023 13:09) (99/61 - 113/65)  BP(mean): --  RR: 20 (24 May 2023 13:09) (16 - 20)  SpO2: 95% (24 May 2023 13:09) (94% - 95%)    Parameters below as of 24 May 2023 13:09  Patient On (Oxygen Delivery Method): room air        PHYSICAL EXAMINATION:  GENERAL: NAD, well built  HEAD:  Atraumatic, Normocephalic  EYES:  conjunctiva and sclera clear  NECK: Supple, No JVD, Normal thyroid  CHEST/LUNG: Clear to auscultation. Clear to percussion bilaterally; No rales, rhonchi, wheezing, or rubs  HEART: Regular rate and rhythm; No murmurs, rubs, or gallops  ABDOMEN: Soft, Nontender, Nondistended; Bowel sounds present  NERVOUS SYSTEM:  Alert & Oriented X3,    EXTREMITIES:  2+ Peripheral Pulses, No clubbing, cyanosis, or edema  SKIN: warm dry                          14.3   5.35  )-----------( 220      ( 24 May 2023 06:56 )             42.7     05-24    139  |  110<H>  |  11  ----------------------------<  92  3.9   |  23  |  0.81    Ca    9.0      24 May 2023 06:56  Phos  3.4     05-24  Mg     2.2     05-24    TPro  7.3  /  Alb  3.0<L>  /  TBili  0.8  /  DBili  x   /  AST  20  /  ALT  34  /  AlkPhos  67  05-24    LIVER FUNCTIONS - ( 24 May 2023 06:56 )  Alb: 3.0 g/dL / Pro: 7.3 g/dL / ALK PHOS: 67 U/L / ALT: 34 U/L DA / AST: 20 U/L / GGT: x               PTT - ( 24 May 2023 06:56 )  PTT:34.9 sec    CAPILLARY BLOOD GLUCOSE      RADIOLOGY & ADDITIONAL TESTS:                   PGY-1 Progress Note discussed with attending    PLEASE CONTACT ON CALL TEAM:  - On Call Team (Please refer to Rufus) FROM 5:00 PM - 8:30PM  - Nightfloat Team FROM 8:30 -7:30 AM    INTERVAL HPI/OVERNIGHT EVENTS: No acute events overnight. Serial abdominal exam was done overnight by call team, and there were no pertinent findings. Pt denies abdominal pain, states that he is experiencing gas, and some scapular pain, likely associated with laying in bed. Pt. also had 3 loose bowel movements today, still on clear liquid diet. Pending MRI.     MEDICATIONS  (STANDING):  enoxaparin Injectable 100 milliGRAM(s) SubCutaneous every 12 hours  sodium chloride 0.9%. 1000 milliLiter(s) (125 mL/Hr) IV Continuous <Continuous>    MEDICATIONS  (PRN):      REVIEW OF SYSTEMS:  CONSTITUTIONAL: No fever, weight loss, or fatigue  RESPIRATORY: No cough, wheezing, chills or hemoptysis; No shortness of breath  CARDIOVASCULAR: No chest pain, palpitations, dizziness, or leg swelling  GASTROINTESTINAL: No abdominal pain. No nausea, vomiting, or hematemesis; No diarrhea or constipation. No melena or hematochezia.  GENITOURINARY: No dysuria or hematuria, urinary frequency  NEUROLOGICAL: No headaches, memory loss, loss of strength, numbness, or tremors  SKIN: No itching, burning, rashes, or lesions     Vital Signs Last 24 Hrs  T(C): 36.6 (24 May 2023 13:09), Max: 36.8 (23 May 2023 20:57)  T(F): 97.9 (24 May 2023 13:09), Max: 98.2 (23 May 2023 20:57)  HR: 68 (24 May 2023 13:09) (68 - 83)  BP: 113/65 (24 May 2023 13:09) (99/61 - 113/65)  BP(mean): --  RR: 20 (24 May 2023 13:09) (16 - 20)  SpO2: 95% (24 May 2023 13:09) (94% - 95%)    Parameters below as of 24 May 2023 13:09  Patient On (Oxygen Delivery Method): room air        PHYSICAL EXAMINATION:  GENERAL: NAD, well built  HEAD:  Atraumatic, Normocephalic  EYES:  conjunctiva and sclera clear  NECK: Supple, No JVD, Normal thyroid  CHEST/LUNG: Clear to auscultation. Clear to percussion bilaterally; No rales, rhonchi, wheezing, or rubs  HEART: Regular rate and rhythm; No murmurs, rubs, or gallops  ABDOMEN: Soft, Nontender, Nondistended, large abdomen, no fluid wave present; Bowel sounds present  NERVOUS SYSTEM:  Alert & Oriented X3,  no focal neuro deficits   EXTREMITIES:  2+ Peripheral Pulses, No clubbing, cyanosis, or edema  SKIN: warm dry                          14.3   5.35  )-----------( 220      ( 24 May 2023 06:56 )             42.7     05-24    139  |  110<H>  |  11  ----------------------------<  92  3.9   |  23  |  0.81    Ca    9.0      24 May 2023 06:56  Phos  3.4     05-24  Mg     2.2     05-24    TPro  7.3  /  Alb  3.0<L>  /  TBili  0.8  /  DBili  x   /  AST  20  /  ALT  34  /  AlkPhos  67  05-24    LIVER FUNCTIONS - ( 24 May 2023 06:56 )  Alb: 3.0 g/dL / Pro: 7.3 g/dL / ALK PHOS: 67 U/L / ALT: 34 U/L DA / AST: 20 U/L / GGT: x               PTT - ( 24 May 2023 06:56 )  PTT:34.9 sec    CAPILLARY BLOOD GLUCOSE      RADIOLOGY & ADDITIONAL TESTS:

## 2023-05-24 NOTE — PROGRESS NOTE ADULT - PROBLEM SELECTOR PLAN 2
No hx of cancer, weight loss, or other constitutional symptoms,   Pt quit smoking and drinking for the past 8 yrs, no hx of cirrhosis  CT abd and pelvis w IV contrast: Small hypodense lesion in the right hepatic lobe, too small to characterize. Mild heterogeneous enhancement pattern of the liver.  - tumor markers AFP, CEA, - negative   - QMA consulted No hx of cancer, weight loss, or other constitutional symptoms,   Pt quit smoking and drinking for the past 8 yrs, no hx of cirrhosis  CT abd and pelvis w IV contrast: Small hypodense lesion in the right hepatic lobe, too small to characterize. Mild heterogeneous enhancement pattern of the liver.  - tumor markers AFP, CEA, - negative   - QMA consulted  - pending results for anti-phospholipid syndrome. APLA and MPN panel are pending. f/u Flow Cyt to r/o [facility].

## 2023-05-24 NOTE — PROGRESS NOTE ADULT - SUBJECTIVE AND OBJECTIVE BOX
INTERVAL HPI/OVERNIGHT EVENTS:  Pt resting comfortably. No acute complaints.   Tolerating clear liquid diet.   +flatus/BM.   Denies N/V  On full dose Lovenox.  Tumor markers WNL.    MEDICATIONS  (STANDING):  enoxaparin Injectable 100 milliGRAM(s) SubCutaneous every 12 hours  sodium chloride 0.9%. 1000 milliLiter(s) (125 mL/Hr) IV Continuous <Continuous>    MEDICATIONS  (PRN):      Vital Signs Last 24 Hrs  T(C): 36.7 (24 May 2023 05:21), Max: 36.8 (23 May 2023 20:57)  T(F): 98.1 (24 May 2023 05:21), Max: 98.2 (23 May 2023 20:57)  HR: 69 (24 May 2023 05:21) (69 - 83)  BP: 105/62 (24 May 2023 05:21) (99/61 - 109/66)  BP(mean): --  RR: 17 (24 May 2023 05:21) (16 - 18)  SpO2: 95% (24 May 2023 05:21) (94% - 95%)    Parameters below as of 24 May 2023 05:21  Patient On (Oxygen Delivery Method): room air    Physical:  General: A&Ox3. NAD.  Abdomen: Soft nondistended, nontender.    LABS:                        14.3   5.35  )-----------( 220      ( 24 May 2023 06:56 )             42.7             05-24    139  |  110<H>  |  11  ----------------------------<  92  3.9   |  23  |  0.81    Ca    9.0      24 May 2023 06:56  Phos  3.4     05-24  Mg     2.2     05-24    TPro  7.3  /  Alb  3.0<L>  /  TBili  0.8  /  DBili  x   /  AST  20  /  ALT  34  /  AlkPhos  67  05-24

## 2023-05-24 NOTE — PROGRESS NOTE ADULT - ASSESSMENT
53 yrs old M, previous heavy alcohol drinker and smoker, pshx of s/p  bilateral varicose veins surgery and Rt leg plate after fx, presented with increased post-prandial bloating. Pt is admitted for portal vein thrombosis and suspected hepatic malignancy. 53 yrs old M, previous heavy alcohol drinker and smoker, pshx of s/p  bilateral varicose veins surgery and Rt leg plate after fx, presented with increased post-prandial bloating. Pt is admitted for portal vein thrombosis and suspected hepatic malignancy. CT abd and pelvis w IV contrast: noted - Extensive thrombosis of the portal veins, portal confluence, SMV, upper IVM, and the distal splenic vein. Initially on heparin gtt, then switched to lovenox 100mg BID, full dose. Pt was initially placed on a clear liquid diet. Surgery and Heme/onc following. Pending MRI to further evaluate for hepatic malignancy. Upon receiving results for the imaging, will then advance diet likely tomorrow, since there is no indication for surgery at this time, and low clinical suspicion for mesenteric ischemia. Labs sent for hepatic malignancy like ca 19-9, CEA, came back negative, pending results for anti-phospholipid syndrome. APLA and MPN panel are pending. Ordered Flow Cyt to r/o [facility].

## 2023-05-24 NOTE — PROGRESS NOTE ADULT - ASSESSMENT
complete note to follow    #VTE Prophylaxis   Assessment and Plan:   · Assessment	    53 yrs old M, previous heavy alcohol drinker and smoker, pshx of s/p  bilateral varicose veins surgery and Rt leg plate after fx, presented with increased post-prandial bloating. Pt reported that he has been having bloating after food for the past 10 days which has increased in intensity however for the past day it got worse and was associated with hyperventilation, subjective fever. Pt stated that he was not able to bend forward, Pt denied nausea, vomiting, diarrhea, weight loss, changes in the bowel habit, hematemesis, hematochezia, or dark stools.   Pt was a heavy alcohol drink and smoked one pack of cigarettes daily for 22 yrs however he has quit both for the past 8 yrs. He exercises daily and uses pre-workout pills and protein powder post workout. Family hx is only significant for DM in father. Pt denied any medications including over the counter and herbal.   He had a recent colonoscopy about one month ago which showed a benign 7 mm polyp in the transverse colon    #Abdominal Thrombosis  p/w post-prandial abdominal distention and ? fever, denies wt loss  4/3 colonoscopy with small polyp  denies hx of VTE or malignancy no FamHx of VTE/malignancy  CBC nl  mild hypoalbuminemia  CT A/P shows: Extensive thrombosis of the portal veins, portal confluence, SMV, upper IVM, and the distal splenic vein. Associated varices. Associated heterogeneous enhancement pattern of the liver. Mesenteric edema and trace mesenteric ascites for which associated bowel ischemia is considered. small hypodense liver lesion  Rec's:  -Chest CT (-)  -MRI liver ordered  -TM's are all nl  -APLA and MPN panel are pending  -Ordered Flow Cyt to r/o [facility]  -Hepatitis panel, HIV are pending   -continue full-dose lovenox  -may change to DOAC if no plan for procedures/Bx  -surgery following for mesenteric edema and poss bowel ischemia, pt diet advanced to clears and tolerating  further recommendations pending above    Thank you for the referral. Will continue to monitor the patient.  Please call with any questions [phone]  Above reviewed with Attending Dr. Anni MARTINEZ/[facility]  [address] [facility], [address], [place], [place]  [phone]  *Note not finalized until signed by Attending Physician

## 2023-05-24 NOTE — PROGRESS NOTE ADULT - PROBLEM SELECTOR PLAN 1
No hx of cancer, weight loss, or other constitutional symptoms,   Pt quit smoking and drinking for the past 8 yrs, no hx of cirrhosis   ? Hepatic malignancy vs hypercoagulable state vs Budd- Chiari syndrome    CT abd and pelvis w IV contrast: noted - Extensive thrombosis of the portal veins, portal confluence, SMV, upper IVM, and the distal splenic vein  - LFTs, Lipase normal   - c/w lovenox 100mg BID   - Diet: clears   - General surgery and vascular surgery consulted- no surgical intervention for now   - QMA consulted No hx of cancer, weight loss, or other constitutional symptoms,   Pt quit smoking and drinking for the past 8 yrs, no hx of cirrhosis   ? Hepatic malignancy vs hypercoagulable state vs Budd- Chiari syndrome    CT abd and pelvis w IV contrast: noted - Extensive thrombosis of the portal veins, portal confluence, SMV, upper IVM, and the distal splenic vein  - LFTs, Lipase normal   - c/w lovenox 100mg BID   - Diet: clears   - General surgery and vascular surgery consulted- no surgical intervention for now   - QMA consulted  - Pending MRI to further evaluate for hepatic malignancy  - Upon receiving results for the imaging, will then advance diet likely tomorrow, since there is no indication for surgery at this time, and low clinical suspicion for mesenteric ischemia.   - Labs sent for hepatic malignancy like ca 19-9, CEA, came back negative, pending results for anti-phospholipid syndrome. APLA and MPN panel are pending. f/u Flow Cyt to r/o [facility].

## 2023-05-24 NOTE — PROGRESS NOTE ADULT - SUBJECTIVE AND OBJECTIVE BOX
Patient is a 53y old  Male who presents with a chief complaint of Portal vein thrombosis and suspected malignancy       SUBJECTIVE / OVERNIGHT EVENTS:        MEDICATIONS  (STANDING):  enoxaparin Injectable 100 milliGRAM(s) SubCutaneous every 12 hours  sodium chloride 0.9%. 1000 milliLiter(s) (125 mL/Hr) IV Continuous <Continuous>    MEDICATIONS  (PRN):    CAPILLARY BLOOD GLUCOSE        I&O's Summary      PHYSICAL EXAM:  Vital Signs Last 24 Hrs  T(C): 36.7 (24 May 2023 05:21), Max: 36.8 (23 May 2023 20:57)  T(F): 98.1 (24 May 2023 05:21), Max: 98.2 (23 May 2023 20:57)  HR: 69 (24 May 2023 05:21) (69 - 83)  BP: 105/62 (24 May 2023 05:21) (99/61 - 109/66)  BP(mean): --  RR: 17 (24 May 2023 05:21) (16 - 18)  SpO2: 95% (24 May 2023 05:21) (94% - 95%)    Parameters below as of 24 May 2023 05:21  Patient On (Oxygen Delivery Method): room air      LABS:                        14.3   5.35  )-----------( 220      ( 24 May 2023 06:56 )             42.7     05-24    139  |  110<H>  |  11  ----------------------------<  92  3.9   |  23  |  0.81    Ca    9.0      24 May 2023 06:56  Phos  3.4     05-24  Mg     2.2     05-24    TPro  7.3  /  Alb  3.0<L>  /  TBili  0.8  /  DBili  x   /  AST  20  /  ALT  34  /  AlkPhos  67  05-24    PT/INR - ( 22 May 2023 12:33 )   PT: 15.1 sec;   INR: 1.27 ratio         PTT - ( 24 May 2023 06:56 )  PTT:34.9 sec              RADIOLOGY & ADDITIONAL TESTS:       Patient is a 53y old  Male who presents with a chief complaint of Portal vein thrombosis and suspected malignancy     SUBJECTIVE / OVERNIGHT EVENTS: events noted. Pt c/o right scapular pain, denies abdominal pain or bloating. Admits flatus and BM    #[id number]    MEDICATIONS  (STANDING):  enoxaparin Injectable 100 milliGRAM(s) SubCutaneous every 12 hours  sodium chloride 0.9%. 1000 milliLiter(s) (125 mL/Hr) IV Continuous <Continuous>    MEDICATIONS  (PRN):    CAPILLARY BLOOD GLUCOSE        I&O's Summary      PHYSICAL EXAM:  Vital Signs Last 24 Hrs  T(C): 36.7 (24 May 2023 05:21), Max: 36.8 (23 May 2023 20:57)  T(F): 98.1 (24 May 2023 05:21), Max: 98.2 (23 May 2023 20:57)  HR: 69 (24 May 2023 05:21) (69 - 83)  BP: 105/62 (24 May 2023 05:21) (99/61 - 109/66)  BP(mean): --  RR: 17 (24 May 2023 05:21) (16 - 18)  SpO2: 95% (24 May 2023 05:21) (94% - 95%)    Parameters below as of 24 May 2023 05:21  Patient On (Oxygen Delivery Method): room air      GEN: NAD; A and O x 3  LUNGS: CTA B/L  HEART: S1 S2  ABDOMEN: soft, non-tender, non-distended, + BS  EXTREMITIES: clubbing  NERVOUS SYSTEM:  Awake and alert; no focal neuro deficits      LABS:                        14.3   5.35  )-----------( 220      ( 24 May 2023 06:56 )             42.7     05-24    139  |  110<H>  |  11  ----------------------------<  92  3.9   |  23  |  0.81    Ca    9.0      24 May 2023 06:56  Phos  3.4     05-24  Mg     2.2     05-24    TPro  7.3  /  Alb  3.0<L>  /  TBili  0.8  /  DBili  x   /  AST  20  /  ALT  34  /  AlkPhos  67  05-24    PT/INR - ( 22 May 2023 12:33 )   PT: 15.1 sec;   INR: 1.27 ratio         PTT - ( 24 May 2023 06:56 )  PTT:34.9 sec

## 2023-05-24 NOTE — CHART NOTE - NSCHARTNOTEFT_GEN_A_CORE
[place]ian Translation provided by Dr. Graff/Myself. Patient seen and examined at bedside. Denies abdominal pain, bloating, diarrhea, flatulence, acid reflux, or any other pertinent GI symptoms. On physical examination, bowel sounds were present, abdomen was soft, non distended, non tender to light and deep palpation, no guarding, no discoloration or hematoma present. ***Consent for Genetic testing was discussed and explained at bedside, and patient expressed understanding and agreement for the previous mentioned*** Consent signed by patient and myself at bedside. Consent provided to nurse. Will keep monitoring.

## 2023-05-24 NOTE — PROGRESS NOTE ADULT - ASSESSMENT
53y.o. Male with resolving mesenteric ischemia, intraabd venous thrombosis    -MRI abd today  -con't FD Lovenox  -If MRI without significant pathology, advance diet as tolerated  -Heme f/u for APLA and MPN panel results

## 2023-05-25 ENCOUNTER — TRANSCRIPTION ENCOUNTER (OUTPATIENT)
Age: 53
End: 2023-05-25

## 2023-05-25 LAB
ALBUMIN SERPL ELPH-MCNC: 3 G/DL — LOW (ref 3.5–5)
ALP SERPL-CCNC: 64 U/L — SIGNIFICANT CHANGE UP (ref 40–120)
ALT FLD-CCNC: 37 U/L DA — SIGNIFICANT CHANGE UP (ref 10–60)
ANION GAP SERPL CALC-SCNC: 7 MMOL/L — SIGNIFICANT CHANGE UP (ref 5–17)
AST SERPL-CCNC: 27 U/L — SIGNIFICANT CHANGE UP (ref 10–40)
B2 GLYCOPROT1 AB SER QL: NEGATIVE — SIGNIFICANT CHANGE UP
B2 GLYCOPROT1 AB SER QL: NEGATIVE — SIGNIFICANT CHANGE UP
BASOPHILS # BLD AUTO: 0.04 K/UL — SIGNIFICANT CHANGE UP (ref 0–0.2)
BASOPHILS NFR BLD AUTO: 0.9 % — SIGNIFICANT CHANGE UP (ref 0–2)
BILIRUB SERPL-MCNC: 0.6 MG/DL — SIGNIFICANT CHANGE UP (ref 0.2–1.2)
BUN SERPL-MCNC: 9 MG/DL — SIGNIFICANT CHANGE UP (ref 7–18)
CALCIUM SERPL-MCNC: 9.1 MG/DL — SIGNIFICANT CHANGE UP (ref 8.4–10.5)
CARDIOLIPIN AB SER-ACNC: NEGATIVE — SIGNIFICANT CHANGE UP
CARDIOLIPIN AB SER-ACNC: NEGATIVE — SIGNIFICANT CHANGE UP
CHLORIDE SERPL-SCNC: 111 MMOL/L — HIGH (ref 96–108)
CO2 SERPL-SCNC: 23 MMOL/L — SIGNIFICANT CHANGE UP (ref 22–31)
CREAT SERPL-MCNC: 0.8 MG/DL — SIGNIFICANT CHANGE UP (ref 0.5–1.3)
DRVVT RATIO: 1.2 RATIO — SIGNIFICANT CHANGE UP (ref 0–1.21)
DRVVT RATIO: 1.24 RATIO — HIGH (ref 0–1.21)
DRVVT SCREEN TO CONFIRM RATIO: ABNORMAL
DRVVT SCREEN TO CONFIRM RATIO: SIGNIFICANT CHANGE UP
EGFR: 105 ML/MIN/1.73M2 — SIGNIFICANT CHANGE UP
EOSINOPHIL # BLD AUTO: 0.05 K/UL — SIGNIFICANT CHANGE UP (ref 0–0.5)
EOSINOPHIL NFR BLD AUTO: 1.1 % — SIGNIFICANT CHANGE UP (ref 0–6)
GLUCOSE SERPL-MCNC: 90 MG/DL — SIGNIFICANT CHANGE UP (ref 70–99)
HCT VFR BLD CALC: 41.3 % — SIGNIFICANT CHANGE UP (ref 39–50)
HGB BLD-MCNC: 13.9 G/DL — SIGNIFICANT CHANGE UP (ref 13–17)
IMM GRANULOCYTES NFR BLD AUTO: 0.2 % — SIGNIFICANT CHANGE UP (ref 0–0.9)
LYMPHOCYTES # BLD AUTO: 1.44 K/UL — SIGNIFICANT CHANGE UP (ref 1–3.3)
LYMPHOCYTES # BLD AUTO: 32.7 % — SIGNIFICANT CHANGE UP (ref 13–44)
MAGNESIUM SERPL-MCNC: 2.1 MG/DL — SIGNIFICANT CHANGE UP (ref 1.6–2.6)
MCHC RBC-ENTMCNC: 29.7 PG — SIGNIFICANT CHANGE UP (ref 27–34)
MCHC RBC-ENTMCNC: 33.7 GM/DL — SIGNIFICANT CHANGE UP (ref 32–36)
MCV RBC AUTO: 88.2 FL — SIGNIFICANT CHANGE UP (ref 80–100)
MONOCYTES # BLD AUTO: 0.59 K/UL — SIGNIFICANT CHANGE UP (ref 0–0.9)
MONOCYTES NFR BLD AUTO: 13.4 % — SIGNIFICANT CHANGE UP (ref 2–14)
NEUTROPHILS # BLD AUTO: 2.28 K/UL — SIGNIFICANT CHANGE UP (ref 1.8–7.4)
NEUTROPHILS NFR BLD AUTO: 51.7 % — SIGNIFICANT CHANGE UP (ref 43–77)
NORMALIZED SCT PPP-RTO: 1 RATIO — SIGNIFICANT CHANGE UP (ref 0–1.16)
NORMALIZED SCT PPP-RTO: 1.09 RATIO — SIGNIFICANT CHANGE UP (ref 0–1.16)
NORMALIZED SCT PPP-RTO: SIGNIFICANT CHANGE UP
NORMALIZED SCT PPP-RTO: SIGNIFICANT CHANGE UP
NRBC # BLD: 0 /100 WBCS — SIGNIFICANT CHANGE UP (ref 0–0)
PHOSPHATE SERPL-MCNC: 3.4 MG/DL — SIGNIFICANT CHANGE UP (ref 2.5–4.5)
PLATELET # BLD AUTO: 237 K/UL — SIGNIFICANT CHANGE UP (ref 150–400)
POTASSIUM SERPL-MCNC: 3.8 MMOL/L — SIGNIFICANT CHANGE UP (ref 3.5–5.3)
POTASSIUM SERPL-SCNC: 3.8 MMOL/L — SIGNIFICANT CHANGE UP (ref 3.5–5.3)
PROT SERPL-MCNC: 7.1 G/DL — SIGNIFICANT CHANGE UP (ref 6–8.3)
RBC # BLD: 4.68 M/UL — SIGNIFICANT CHANGE UP (ref 4.2–5.8)
RBC # FLD: 11.8 % — SIGNIFICANT CHANGE UP (ref 10.3–14.5)
SODIUM SERPL-SCNC: 141 MMOL/L — SIGNIFICANT CHANGE UP (ref 135–145)
WBC # BLD: 4.41 K/UL — SIGNIFICANT CHANGE UP (ref 3.8–10.5)
WBC # FLD AUTO: 4.41 K/UL — SIGNIFICANT CHANGE UP (ref 3.8–10.5)

## 2023-05-25 PROCEDURE — 99232 SBSQ HOSP IP/OBS MODERATE 35: CPT | Mod: GC

## 2023-05-25 RX ORDER — RIVAROXABAN 15 MG-20MG
1 KIT ORAL
Qty: 60 | Refills: 0
Start: 2023-05-25 | End: 2023-06-23

## 2023-05-25 RX ORDER — APIXABAN 2.5 MG/1
1 TABLET, FILM COATED ORAL
Qty: 60 | Refills: 0
Start: 2023-05-25 | End: 2023-06-23

## 2023-05-25 RX ADMIN — ENOXAPARIN SODIUM 100 MILLIGRAM(S): 100 INJECTION SUBCUTANEOUS at 17:22

## 2023-05-25 RX ADMIN — ENOXAPARIN SODIUM 100 MILLIGRAM(S): 100 INJECTION SUBCUTANEOUS at 05:41

## 2023-05-25 RX ADMIN — SODIUM CHLORIDE 125 MILLILITER(S): 9 INJECTION INTRAMUSCULAR; INTRAVENOUS; SUBCUTANEOUS at 05:41

## 2023-05-25 RX ADMIN — SODIUM CHLORIDE 125 MILLILITER(S): 9 INJECTION INTRAMUSCULAR; INTRAVENOUS; SUBCUTANEOUS at 11:33

## 2023-05-25 NOTE — PROGRESS NOTE ADULT - PROBLEM SELECTOR PLAN 3
DVT prophylaxis: pt lovenox FD for portal vein thrombosis, will switch to DOAC upon d/c
DVT prophylaxis: pt lovenox FD for portal vein thrombosis, will switch to DOAC upon d/c
DVT prophylaxis: pt lovenox FD for portal vein thrombosis
DVT prophylaxis: pt lovenox FD for portal vein thrombosis

## 2023-05-25 NOTE — PROGRESS NOTE ADULT - ASSESSMENT
complete note to follow    #VTE Prophylaxis     Assessment and Plan:   · Assessment	    53 yrs old M, previous heavy alcohol drinker and smoker, pshx of s/p  bilateral varicose veins surgery and Rt leg plate after fx, presented with increased post-prandial bloating. Pt reported that he has been having bloating after food for the past 10 days which has increased in intensity however for the past day it got worse and was associated with hyperventilation, subjective fever. Pt stated that he was not able to bend forward, Pt denied nausea, vomiting, diarrhea, weight loss, changes in the bowel habit, hematemesis, hematochezia, or dark stools.   Pt was a heavy alcohol drink and smoked one pack of cigarettes daily for 22 yrs however he has quit both for the past 8 yrs. He exercises daily and uses pre-workout pills and protein powder post workout. Family hx is only significant for DM in father. Pt denied any medications including over the counter and herbal.   He had a recent colonoscopy about one month ago which showed a benign 7 mm polyp in the transverse colon    #Abdominal Thrombosis  p/w post-prandial abdominal distention and ? fever, denies wt loss  4/3 colonoscopy with small polyp  denies hx of VTE or malignancy no FamHx of VTE/malignancy  CBC nl  mild hypoalbuminemia  CT A/P shows: Extensive thrombosis of the portal veins, portal confluence, SMV, upper IVM, and the distal splenic vein. Associated varices. Associated heterogeneous enhancement pattern of the liver. Mesenteric edema and trace mesenteric ascites for which associated bowel ischemia is considered. small hypodense liver lesion  Rec's:  -Chest CT (-)  -MRI liver negative for HCC  -TM's are all nl  -APLA panel shows ACL and Best 2 are (-), LA pending and MPN panel are pending  -Flow Cyt to r/o [facility] is pending  -Hepatitis panel, HIV are both (-)  -on full-dose lovenox, plan to switch to DOAC  -surgery following for mesenteric edema and poss bowel ischemia, pt diet advanced, tolerating well  d/c planning  pt has appt to f/u with Hematologist Dr. Calvin 6/1 at 2:30    Thank you for the referral. Will continue to monitor the patient.  Please call with any questions [phone]  Above reviewed with Attending Dr. Hutton  [facility]  [address], [address], [place], [place]  [phone]  *Note not finalized until signed by Attending Physician

## 2023-05-25 NOTE — PROGRESS NOTE ADULT - ASSESSMENT
53y.o. Male with resolving mesenteric ischemia, intraabd venous thrombosis. MRI 5/25 showing extensive mesenteric, splenic and portal venous thrombosis with cavernous transofrmation and additional venous collaterals, no evidence of HCC, cholelithiasis reviewed with attending     -Recommend full liquid diet   -con't FD Lovenox  -Heme f/u   -Discussed with Dr. Hadley

## 2023-05-25 NOTE — PROGRESS NOTE ADULT - ASSESSMENT
53 yrs old M, previous heavy alcohol drinker and smoker, pshx of s/p  bilateral varicose veins surgery and Rt leg plate after fx, presented with increased post-prandial bloating. Pt is admitted for portal vein thrombosis and suspected hepatic malignancy. CT abd and pelvis w IV contrast: noted - Extensive thrombosis of the portal veins, portal confluence, SMV, upper IVM, and the distal splenic vein. Initially on heparin gtt, then switched to lovenox 100mg BID, full dose. Pt was initially placed on a clear liquid diet. Surgery and Heme/onc following. Pending MRI to further evaluate for hepatic malignancy. Upon receiving results for the imaging, will then advance diet likely tomorrow, since there is no indication for surgery at this time, and low clinical suspicion for mesenteric ischemia. Labs sent for hepatic malignancy like ca 19-9, CEA, came back negative, B-glycoprotein negative, anti-cardiolipin negative. MPN panel are pending. Ordered Flow Cyt to r/o [facility]. His diet was advanced to soft and bite sized today. MRI was done yesterday, no concern for HCC. Will likely start DOAC upon d/c, likely tomorrow.

## 2023-05-25 NOTE — PROGRESS NOTE ADULT - SUBJECTIVE AND OBJECTIVE BOX
PGY-1 Progress Note discussed with attending    PLEASE CONTACT ON CALL TEAM:  - On Call Team (Please refer to Rufus) FROM 5:00 PM - 8:30PM  - Nightfloat Team FROM 8:30 -7:30 AM    INTERVAL HPI/OVERNIGHT EVENTS: No acute events overnight. Denies abdominal pain, n/v. States that his bowel movements has been mainly clear liquid, because of his diet. His diet was advanced to soft and bite sized today. MRI was done yesterday, no concern for HCC.    MEDICATIONS  (STANDING):  enoxaparin Injectable 100 milliGRAM(s) SubCutaneous every 12 hours  sodium chloride 0.9%. 1000 milliLiter(s) (125 mL/Hr) IV Continuous <Continuous>    MEDICATIONS  (PRN):  acetaminophen     Tablet .. 650 milliGRAM(s) Oral every 6 hours PRN Mild Pain (1 - 3)  aluminum hydroxide/magnesium hydroxide/simethicone Suspension 30 milliLiter(s) Oral every 4 hours PRN Dyspepsia      REVIEW OF SYSTEMS:  CONSTITUTIONAL: No fever, weight loss, or fatigue  RESPIRATORY: No cough, wheezing, chills or hemoptysis; No shortness of breath  CARDIOVASCULAR: No chest pain, palpitations, dizziness, or leg swelling  GASTROINTESTINAL: No abdominal pain. No nausea, vomiting, or hematemesis; No diarrhea or constipation. No melena or hematochezia.  GENITOURINARY: No dysuria or hematuria, urinary frequency  NEUROLOGICAL: No headaches, memory loss, loss of strength, numbness, or tremors  SKIN: No itching, burning, rashes, or lesions     Vital Signs Last 24 Hrs  T(C): 36.6 (25 May 2023 04:30), Max: 36.6 (24 May 2023 13:09)  T(F): 97.9 (25 May 2023 04:30), Max: 97.9 (24 May 2023 13:09)  HR: 76 (25 May 2023 04:30) (68 - 76)  BP: 107/69 (25 May 2023 04:30) (96/60 - 113/65)  BP(mean): --  RR: 18 (25 May 2023 04:30) (18 - 20)  SpO2: 96% (25 May 2023 04:30) (94% - 96%)    Parameters below as of 25 May 2023 04:30  Patient On (Oxygen Delivery Method): room air        PHYSICAL EXAMINATION:  GENERAL: NAD, well built  HEAD:  Atraumatic, Normocephalic  EYES:  conjunctiva and sclera clear  NECK: Supple, No JVD, Normal thyroid  CHEST/LUNG: Clear to auscultation. Clear to percussion bilaterally; No rales, rhonchi, wheezing, or rubs  HEART: Regular rate and rhythm; No murmurs, rubs, or gallops  ABDOMEN: Soft, Nontender, Nondistended; Bowel sounds present  NERVOUS SYSTEM:  Alert & Oriented X3,  no focal neuro deficits   EXTREMITIES:  2+ Peripheral Pulses, No clubbing, cyanosis, or edema  SKIN: warm dry                          13.9   4.41  )-----------( 237      ( 25 May 2023 06:16 )             41.3     05-25    141  |  111<H>  |  9   ----------------------------<  90  3.8   |  23  |  0.80    Ca    9.1      25 May 2023 06:16  Phos  3.4     05-25  Mg     2.1     05-25    TPro  7.1  /  Alb  3.0<L>  /  TBili  0.6  /  DBili  x   /  AST  27  /  ALT  37  /  AlkPhos  64  05-25    LIVER FUNCTIONS - ( 25 May 2023 06:16 )  Alb: 3.0 g/dL / Pro: 7.1 g/dL / ALK PHOS: 64 U/L / ALT: 37 U/L DA / AST: 27 U/L / GGT: x               PTT - ( 24 May 2023 12:57 )  PTT:36.1 sec    CAPILLARY BLOOD GLUCOSE      RADIOLOGY & ADDITIONAL TESTS:

## 2023-05-25 NOTE — DISCHARGE NOTE PROVIDER - CARE PROVIDER_API CALL
Miguel Angel Calvin (MD)  Hematology; Internal Medicine; Medical Oncology  [address] [facility], [address]  [place] [id number]  Phone: [phone]  Fax: [phone]  Scheduled Appointment: 06/01/2023 02:30 PM

## 2023-05-25 NOTE — DISCHARGE NOTE PROVIDER - NSDCFUSCHEDAPPT_GEN_ALL_CORE_FT
[facility]  [facility]STR[address]  Scheduled Appointment: 06/06/2023

## 2023-05-25 NOTE — PROGRESS NOTE ADULT - PROBLEM SELECTOR PROBLEM 2
Abnormal finding on imaging of liver

## 2023-05-25 NOTE — PROGRESS NOTE ADULT - SUBJECTIVE AND OBJECTIVE BOX
PGY-1 Progress Note discussed with attending    PLEASE CONTACT ON CALL TEAM:  - On Call Team (Please refer to Rufus) FROM 5:00 PM - 8:30PM  - Nightfloat Team FROM 8:30 -7:30 AM    INTERVAL HPI/OVERNIGHT EVENTS: No acute events overnight. Pt is tolerating clear liquids advancing diet to soft and bite sized. MRI done last night. No abdominal pain, fevers, chills, diarrhea, constipation.     MEDICATIONS  (STANDING):  enoxaparin Injectable 100 milliGRAM(s) SubCutaneous every 12 hours  sodium chloride 0.9%. 1000 milliLiter(s) (125 mL/Hr) IV Continuous <Continuous>    MEDICATIONS  (PRN):  acetaminophen     Tablet .. 650 milliGRAM(s) Oral every 6 hours PRN Mild Pain (1 - 3)  aluminum hydroxide/magnesium hydroxide/simethicone Suspension 30 milliLiter(s) Oral every 4 hours PRN Dyspepsia      REVIEW OF SYSTEMS:  CONSTITUTIONAL: No fever, weight loss, or fatigue  RESPIRATORY: No cough, wheezing, chills or hemoptysis; No shortness of breath  CARDIOVASCULAR: No chest pain, palpitations, dizziness, or leg swelling  GASTROINTESTINAL: No abdominal pain. No nausea, vomiting, or hematemesis; No diarrhea or constipation. No melena or hematochezia.  GENITOURINARY: No dysuria or hematuria, urinary frequency  NEUROLOGICAL: No headaches, memory loss, loss of strength, numbness, or tremors  SKIN: No itching, burning, rashes, or lesions     Vital Signs Last 24 Hrs  T(C): 36.6 (25 May 2023 04:30), Max: 36.6 (25 May 2023 04:30)  T(F): 97.9 (25 May 2023 04:30), Max: 97.9 (25 May 2023 04:30)  HR: 76 (25 May 2023 04:30) (69 - 76)  BP: 107/69 (25 May 2023 04:30) (96/60 - 107/69)  BP(mean): --  RR: 18 (25 May 2023 04:30) (18 - 18)  SpO2: 96% (25 May 2023 04:30) (94% - 96%)    Parameters below as of 25 May 2023 04:30  Patient On (Oxygen Delivery Method): room air        PHYSICAL EXAMINATION:  GENERAL: NAD, well built  HEAD:  Atraumatic, Normocephalic  EYES:  conjunctiva and sclera clear  NECK: Supple, No JVD, Normal thyroid  CHEST/LUNG: Clear to auscultation. Clear to percussion bilaterally; No rales, rhonchi, wheezing, or rubs  HEART: Regular rate and rhythm; No murmurs, rubs, or gallops  ABDOMEN: Soft, Nontender, Nondistended; Bowel sounds present  NERVOUS SYSTEM:  Alert & Oriented X3,  no focal neuro deficits   EXTREMITIES:  2+ Peripheral Pulses, No clubbing, cyanosis, or edema  SKIN: warm dry                          13.9   4.41  )-----------( 237      ( 25 May 2023 06:16 )             41.3     05-25    141  |  111<H>  |  9   ----------------------------<  90  3.8   |  23  |  0.80    Ca    9.1      25 May 2023 06:16  Phos  3.4     05-25  Mg     2.1     05-25    TPro  7.1  /  Alb  3.0<L>  /  TBili  0.6  /  DBili  x   /  AST  27  /  ALT  37  /  AlkPhos  64  05-25    LIVER FUNCTIONS - ( 25 May 2023 06:16 )  Alb: 3.0 g/dL / Pro: 7.1 g/dL / ALK PHOS: 64 U/L / ALT: 37 U/L DA / AST: 27 U/L / GGT: x               PTT - ( 24 May 2023 12:57 )  PTT:36.1 sec    CAPILLARY BLOOD GLUCOSE      RADIOLOGY & ADDITIONAL TESTS:

## 2023-05-25 NOTE — PROGRESS NOTE ADULT - SUBJECTIVE AND OBJECTIVE BOX
Patient is a 53y old  Male who presents with a chief complaint of Portal vein thrombosis and suspected malignancy       SUBJECTIVE / OVERNIGHT EVENTS:        MEDICATIONS  (STANDING):  enoxaparin Injectable 100 milliGRAM(s) SubCutaneous every 12 hours  sodium chloride 0.9%. 1000 milliLiter(s) (125 mL/Hr) IV Continuous <Continuous>    MEDICATIONS  (PRN):  acetaminophen     Tablet .. 650 milliGRAM(s) Oral every 6 hours PRN Mild Pain (1 - 3)  aluminum hydroxide/magnesium hydroxide/simethicone Suspension 30 milliLiter(s) Oral every 4 hours PRN Dyspepsia    CAPILLARY BLOOD GLUCOSE        I&O's Summary      PHYSICAL EXAM:  Vital Signs Last 24 Hrs  T(C): 36.6 (25 May 2023 04:30), Max: 36.6 (24 May 2023 13:09)  T(F): 97.9 (25 May 2023 04:30), Max: 97.9 (24 May 2023 13:09)  HR: 76 (25 May 2023 04:30) (68 - 76)  BP: 107/69 (25 May 2023 04:30) (96/60 - 113/65)  BP(mean): --  RR: 18 (25 May 2023 04:30) (18 - 20)  SpO2: 96% (25 May 2023 04:30) (94% - 96%)    Parameters below as of 25 May 2023 04:30  Patient On (Oxygen Delivery Method): room air          LABS:                        13.9   4.41  )-----------( 237      ( 25 May 2023 06:16 )             41.3     05-25    141  |  111<H>  |  9   ----------------------------<  90  3.8   |  23  |  0.80    Ca    9.1      25 May 2023 06:16  Phos  3.4     05-25  Mg     2.1     05-25    TPro  7.1  /  Alb  3.0<L>  /  TBili  0.6  /  DBili  x   /  AST  27  /  ALT  37  /  AlkPhos  64  05-25    PTT - ( 24 May 2023 12:57 )  PTT:36.1 sec             Patient is a 53y old  Male who presents with a chief complaint of Portal vein thrombosis and suspected malignancy       SUBJECTIVE / OVERNIGHT EVENTS: events noted. No new complaints        MEDICATIONS  (STANDING):  enoxaparin Injectable 100 milliGRAM(s) SubCutaneous every 12 hours  sodium chloride 0.9%. 1000 milliLiter(s) (125 mL/Hr) IV Continuous <Continuous>    MEDICATIONS  (PRN):  acetaminophen     Tablet .. 650 milliGRAM(s) Oral every 6 hours PRN Mild Pain (1 - 3)  aluminum hydroxide/magnesium hydroxide/simethicone Suspension 30 milliLiter(s) Oral every 4 hours PRN Dyspepsia    CAPILLARY BLOOD GLUCOSE        I&O's Summary      PHYSICAL EXAM:  Vital Signs Last 24 Hrs  T(C): 36.6 (25 May 2023 04:30), Max: 36.6 (24 May 2023 13:09)  T(F): 97.9 (25 May 2023 04:30), Max: 97.9 (24 May 2023 13:09)  HR: 76 (25 May 2023 04:30) (68 - 76)  BP: 107/69 (25 May 2023 04:30) (96/60 - 113/65)  BP(mean): --  RR: 18 (25 May 2023 04:30) (18 - 20)  SpO2: 96% (25 May 2023 04:30) (94% - 96%)    Parameters below as of 25 May 2023 04:30  Patient On (Oxygen Delivery Method): room air      GEN: NAD; A and O x 3  LUNGS: CTA B/L  HEART: S1 S2  ABDOMEN: soft, non-tender, non-distended, + BS  EXTREMITIES: clubbing  NERVOUS SYSTEM:  Awake and alert; no focal neuro deficits    LABS:                        13.9   4.41  )-----------( 237      ( 25 May 2023 06:16 )             41.3     05-25    141  |  111<H>  |  9   ----------------------------<  90  3.8   |  23  |  0.80    Ca    9.1      25 May 2023 06:16  Phos  3.4     05-25  Mg     2.1     05-25    TPro  7.1  /  Alb  3.0<L>  /  TBili  0.6  /  DBili  x   /  AST  27  /  ALT  37  /  AlkPhos  64  05-25    PTT - ( 24 May 2023 12:57 )  PTT:36.1 sec

## 2023-05-25 NOTE — PROGRESS NOTE ADULT - PROBLEM SELECTOR PLAN 1
No hx of cancer, weight loss, or other constitutional symptoms,   Pt quit smoking and drinking for the past 8 yrs, no hx of cirrhosis   ? Hepatic malignancy vs hypercoagulable state vs Budd- Chiari syndrome    CT abd and pelvis w IV contrast: noted - Extensive thrombosis of the portal veins, portal confluence, SMV, upper IVM, and the distal splenic vein  - LFTs, Lipase normal   - c/w lovenox 100mg BID , will transition to DOAC upon D/c  - Diet: soft and bite sized   - General surgery and vascular surgery consulted- no surgical intervention for now   - QMA consulted  - MRI 5/25- No arterial phase hyper-enhancing lesion to suggest HCC. Extensive mesenteric, splenic and portal venous thrombosis with cavernous transformation and additional venous collaterals. Cholelithiasis.   - no indication for surgery at this time, and low clinical suspicion for mesenteric ischemia.   - Labs sent for hepatic malignancy like ca 19-9, CEA, came back negative, B-glycoprotein negative, anti-cardiolipin negative. MPN panel are pending. Ordered Flow Cyt to r/o [facility].

## 2023-05-25 NOTE — PROGRESS NOTE ADULT - ATTENDING COMMENTS
Patient seen and examined at bedside. Case discussed with housestaff. Communicated with patient via  #[id number], Ni. Patient underwent MRI of his abdomen which was negative for HCC or other signs of malignancy. MRI confirmed previously known extensive thrombosis of the portal vein, SMV, IMV, and splenic vein. Given MRI findings, patient's diet advanced to small and bite-sized. I assessed the patient after he had his first solid meal and he denies any bloating, abdominal pain, nausea, or vomiting. On exam, the abdomen remains with +BS, soft, NT, ND. The etiology of his thrombosis continues to remain unclear, but can be further worked up as an outpatient. Case discussed with hematology and an appointment has been made for the patient with Dr. Calvin on 6/1/23 at 2:30pm for further evaluation. DOACs sent to the patient's pharmacy for pricing and patient does not have a co-pay for either Eliquis or Xarelto. Given the superior side effect profile of Eliquis, will dc patient on this DOAC. Remaining care as noted above.
Patient seen and examined at bedside with housestaff. Indonesian interpretation provided by Dr. Guevara. Patient reports no complaints at this time except for the fact that he is very hungry as he has not eaten in the past 24 hours. On exam, patient with +BS, soft, non-tender abdomen. Case discussed with general surgery who report that patient may start on a clear liquid diet at this time and see how he feels. CT chest negative for any obvious malignancy as are the patient's tumor markers. CT A/P reveals a small hypodense lesion in the R hepatic lobe. Plan for MRI to further characterize but MRI is concerned as they think they see a tiny piece of metal in the chest. 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 Could be related to this? but patient denies any knowledge of metal in his chest. Will f/u with radiology regarding the next steps. Continue full dose Lovenox for extensive venous thrombosis. Remaining care as noted above.
Patient seen and examined on 5/24/23 at bedside with housestaff. Maltese interpretation provided by Dr. Guevara. Patient's only complaint remains that he is very hungry and wants to try solid food. He denies any post-prandial pain with the clear liquid diet. Case discussed with surgical team and patient's diet may be advanced pending MRI results. Will f/u MRI and subsequent read. Appreciate heme-onc recs. Will continue with Lovenox until discharge followed by transition to DOAC provided patient's insurance covers it at an acceptable amount. Still no obvious explanation for the patient's extensive thrombosis in his abdomen. Remaining care as noted above.

## 2023-05-25 NOTE — DISCHARGE NOTE PROVIDER - HOSPITAL COURSE
53 yrs old M, previous heavy alcohol drinker and smoker, pshx of s/p  bilateral varicose veins surgery and Rt leg plate after fx, presented with increased post-prandial bloating. Pt is admitted for portal vein thrombosis and suspected hepatic malignancy. CT abd and pelvis w IV contrast: noted - Extensive thrombosis of the portal veins, portal confluence, SMV, upper IVM, and the distal splenic vein. Initially on heparin gtt, then switched to lovenox 100mg BID, full dose. Pt was initially placed on a clear liquid diet, then advanced to soft and bite sized. Surgery and Heme/onc following. MRI 5/25- No arterial phase hyper-enhancing lesion to suggest HCC. Extensive mesenteric, splenic and portal venous thrombosis with cavernous transformation and additional venous collaterals. Cholelithiasis. There is no indication for surgery at this time, and low clinical suspicion for mesenteric ischemia. Labs sent for hepatic malignancy like Ca 19-9, CEA, came back negative, B-glycoprotein negative, anti-cardiolipin negative. MPN panel are pending. Ordered Flow Cyt to r/o PNH. His diet was advanced to soft and bite sized today. Will likely start DOAC upon d/c. Patient is stable for discharge per attending and is advised to follow up with PCP as outpatient  Please refer to patient's complete medical chart with documents for a full hospital course, for this is only a brief summary.   53 yrs old M, previous heavy alcohol drinker and smoker, pshx of s/p  bilateral varicose veins surgery and Rt leg plate after fx, presented with increased post-prandial bloating. CT abd and pelvis w IV contrast showed  Extensive thrombosis of the portal veins, portal confluence, SMV, upper IVM, and the distal splenic vein. Associated with heterogeneous enhancement pattern of the liver, mesenteric  edema and trace mesenteric ascites also noted with concerns for bowel ischemia. Pt was evaluated by general surgery, as per their assessment there was no indication for emergency laparoscopic surgery. Pt was admitted for portal vein thrombosis and started on FD AC with lovenox. Given extensive clot burden  and liver enhancement, there was concern for underlying malignany. He underwent MR of the liver on 5/25 which showed no arterial phase hyper-enhancing lesion to suggest HCC. Extensive mesenteric, splenic and portal venous thrombosis with cavernous transformation and additional venous collaterals. Cholelithiasis. Tumor markers  inluding Ca 19-9, CEA, AFP were all negative. Pt was also evaluated for APL, B-glycoprotein negative, anti-cardiolipin were both negative, DRVVT weakly positive. As per heme/onc recommendations pt to be evaluated for PNH and MPD as other potential causes (specific test currently testing). Pt has been tolerating PO intake.       Given clinical improvement he is to be discharged home today.  Patient is stable for discharge per attending and is advised to follow up with PCP as outpatient.  Please refer to patient's complete medical chart with documents for a full hospital course, for this is only a brief summary.   53 yrs old M, previous heavy alcohol drinker and smoker, pshx of s/p  bilateral varicose veins surgery and Rt leg plate after fx, presented with increased post-prandial bloating. CT abd and pelvis w IV contrast showed  Extensive thrombosis of the portal veins, portal confluence, SMV, upper IVM, and the distal splenic vein. Associated with heterogeneous enhancement pattern of the liver, mesenteric  edema and trace mesenteric ascites also noted with concerns for bowel ischemia. Pt was evaluated by general surgery, as per their assessment there was no indication for emergency laparoscopic surgery. Pt was admitted for portal vein thrombosis and started on FD AC with lovenox. Given extensive clot burden  and liver enhancement, there was concern for underlying malignany. He underwent MR of the liver on 5/25 which showed no arterial phase hyper-enhancing lesion to suggest HCC. Extensive mesenteric, splenic and portal venous thrombosis with cavernous transformation and additional venous collaterals. Cholelithiasis. Tumor markers  including Ca 19-9, CEA, AFP were all negative. Pt was also evaluated for APL, B-glycoprotein negative, anti-cardiolipin were both negative, DRVVT weakly positive. As per heme/onc recommendations pt to be evaluated for PNH and MPD as other potential causes (specific test currently testing). Pt has been tolerating PO intake.       Given clinical improvement he is to be discharged home today.  Patient is stable for discharge per attending and is advised to follow up with PCP as outpatient.  Please refer to patient's complete medical chart with documents for a full hospital course, for this is only a brief summary.

## 2023-05-25 NOTE — DISCHARGE NOTE PROVIDER - ATTENDING DISCHARGE PHYSICAL EXAMINATION:
Patient seen and examined prior to discharge. Case discussed with housestaff. Communicated with patient via  #[id number], Rosmery. Patient reports feeling well and would like to go home. He denies any pain or bloating after eating at this time. We discussed the importance of following up with hematology and he was informed of his importance on 6/1/23 at 2:30pm. Patient reports he will go. We also discussed how to take Eliquis (10mg po BID x 7 days and then 5mg po BID). Patient expressed understanding and gratitude. He is medically stable for dc home.    PHYSICAL EXAM:  GENERAL: NAD, well-developed, sitting up in bed  HEAD:  Atraumatic, Normocephalic  EYES:  conjunctiva and sclera clear  NECK: Supple, No JVD  CHEST/LUNG: Clear to auscultation bilaterally; No wheeze  HEART: Regular rate and rhythm; No murmurs, rubs, or gallops  ABDOMEN: Soft, Nontender, Nondistended; Bowel sounds present  EXTREMITIES:  2+ Peripheral Pulses, No clubbing, cyanosis, or edema  PSYCH: AAOx3, calm, cooperative  NEUROLOGY: non-focal  SKIN: No rashes or lesions

## 2023-05-25 NOTE — DISCHARGE NOTE PROVIDER - NSDCCPCAREPLAN_GEN_ALL_CORE_FT
PRINCIPAL DISCHARGE DIAGNOSIS  Diagnosis: Portal vein thrombosis  Assessment and Plan of Treatment: You presented to the emergency room with bloating after eating meals. CT abdomen and pelvis showed extensive clots within multiple veins of your abdomen (most in the veins of your liver and spleen, and gastrointestinal tract). Portal vein thrombosis is blockage or narrowing of the portal vein (the blood vessel that brings blood to the liver from the intestines) by a blood clot. The cause of these blood clots is still unclear. We sent lab work and consulted Hematology/Oncology. Causes can include cancer, Anti-phospholipid syndrome. So far, the work up for cancers and Anti-phospholipid syndrome have been negative. An anticoagulant, such as heparin, is used over the long term to help prevent clots from reocurring or enlarging. Anticoagulants do not dissolve existing clots. You were initially on a heparin drip as a blood thinner, then you were switched to LOVENOX 100mg twice a day. Upon admission, you will be placed on XXXXXX.   You were initially placed on a clear liquid diet, then your diet was advanced. Please continue to follow up with Hematology/Oncology out-patient within 1 week from discharge.     PRINCIPAL DISCHARGE DIAGNOSIS  Diagnosis: Portal vein thrombosis  Assessment and Plan of Treatment: You presented to the emergency room with bloating after eating meals. CT abdomen and pelvis showed extensive clots within multiple veins of your abdomen (most in the veins of your liver and spleen, and gastrointestinal tract).  You were diagnosed with Portal vein thrombosis which  is a blockage or narrowing of the portal vein (the blood vessel that brings blood to the liver from the intestines) by a blood clot. The cause of these blood clots is still unclear. We sent lab work and consulted Hematology/Oncology. Causes can include cancer, Anti-phospholipid syndrome. So far, the work up for cancers and Anti-phospholipid syndrome has been negative.  You were started on anticoagulation ( blood thinner) with heparin to help prevent clots from reocurring or enlarging.  Upon discharge you will be starting on Eliquis 5 mg tablets , please take 2 tablets every 12 hours for a total of 5 more doses (you should take the next dose of eliquis is today at 6 PM), you should continue this regimen of 2 tablets every 12 hours until   5/28/23. From then on you will continue to take eliquis 5 mg tablet every 12 hours.  You will also need to follow up outpatient with dr. Calvin to determine for how long you will be on anticoagulation. Your appointment has been set up for 6/1/23 at 2:30 PM.  Se le ha diagnosticado thrombosis de la vena jermaine.  Por esta april usted deber continuar con anticuagulacion (adelgazante de la jeovanny). El medicamento para la anticoagulacion se llama Eliquis y lo debe silvana de la siguiente manera. [place] 2 tabletas de Eliquis de 5 mg cada 12 horas hasta que complete un total de 5 dosis, la cual debera silvana hasta el 5/28/23 (la siguiente dosis de Eliquis es hoy a las 6 PM). Dalia vez complete jenny esquema debera continuar tomando tableta de Eliquis 5mg cada 12 horas. Acuda a angel pauly el 6/1/23 a las 2:30 PM con el Dr. Calvin para continuar seguimento con el servicio de hemato-oncologia.     PRINCIPAL DISCHARGE DIAGNOSIS  Diagnosis: Portal vein thrombosis  Assessment and Plan of Treatment: You presented to the emergency room with bloating after eating meals. CT abdomen and pelvis showed extensive clots within multiple veins of your abdomen (most in the veins of your liver and spleen, and gastrointestinal tract).  You were diagnosed with Portal vein thrombosis which  is a blockage or narrowing of the portal vein (the blood vessel that brings blood to the liver from the intestines) by a blood clot. The cause of these blood clots is still unclear. We sent lab work and consulted Hematology/Oncology. Causes can include cancer, Anti-phospholipid syndrome. So far, the work up for cancers and Anti-phospholipid syndrome has been negative.  You were started on anticoagulation ( blood thinner) with heparin to help prevent clots from reocurring or enlarging.  Upon discharge you will be starting on Eliquis 5 mg tablets , please take 2 tablets every 12 hours for a total 7 days until 6/2/23. From then on you will continue to take eliquis 5 mg tablet every 12 hours.  You will also need to follow up outpatient with dr. Calvin to determine for how long you will be on anticoagulation. Your appointment has been set up for 6/1/23 at 2:30 PM.  Se le ha diagnosticado thrombosis de la vena jermaine.  Por esta april usted deber continuar con anticuagulacion (adelgazante de la jeovanny). El medicamento para la anticoagulacion se llama Eliquis y lo debe silvana de la siguiente manera. [place] 2 tabletas de Eliquis de 5 mg cada 12 horas hasta que complete 7 rangel hasta el 6/2/23.  Dalia vez complete jenny esquema debera continuar tomando tableta de Eliquis 5mg cada 12 horas. Acuda a angel pauly el 6/1/23 a las 2:30 PM con el Dr. Calvin para continuar seguimento con el servicio de hemato-oncologia.

## 2023-05-25 NOTE — PROGRESS NOTE ADULT - SUBJECTIVE AND OBJECTIVE BOX
INTERVAL HPI/OVERNIGHT EVENTS:  Seen and examined at bedside with medicine NP  Pt resting comfortably. No acute complaints. No abdominal pain   Tolerating diet.   flatus/liquid BM nonbloody    Denies N/V    MEDICATIONS  (STANDING):  enoxaparin Injectable 100 milliGRAM(s) SubCutaneous every 12 hours  sodium chloride 0.9%. 1000 milliLiter(s) (125 mL/Hr) IV Continuous <Continuous>    MEDICATIONS  (PRN):  acetaminophen     Tablet .. 650 milliGRAM(s) Oral every 6 hours PRN Mild Pain (1 - 3)  aluminum hydroxide/magnesium hydroxide/simethicone Suspension 30 milliLiter(s) Oral every 4 hours PRN Dyspepsia      Vital Signs Last 24 Hrs  T(C): 36.6 (25 May 2023 04:30), Max: 36.6 (24 May 2023 13:09)  T(F): 97.9 (25 May 2023 04:30), Max: 97.9 (24 May 2023 13:09)  HR: 76 (25 May 2023 04:30) (68 - 76)  BP: 107/69 (25 May 2023 04:30) (96/60 - 113/65)  BP(mean): --  RR: 18 (25 May 2023 04:30) (18 - 20)  SpO2: 96% (25 May 2023 04:30) (94% - 96%)    Parameters below as of 25 May 2023 04:30  Patient On (Oxygen Delivery Method): room air        Physical:  General: A&Ox3. NAD.  Respirations: Unlabored   Abdomen: Soft nondistended, nontender, no rebound, no guarding, no peritonitis     I&O's Detail      LABS:                        13.9   4.41  )-----------( 237      ( 25 May 2023 06:16 )             41.3             05-25    141  |  111<H>  |  9   ----------------------------<  90  3.8   |  23  |  0.80    Ca    9.1      25 May 2023 06:16  Phos  3.4     05-25  Mg     2.1     05-25    TPro  7.1  /  Alb  3.0<L>  /  TBili  0.6  /  DBili  x   /  AST  27  /  ALT  37  /  AlkPhos  64  05-25    < from: MR Abdomen w/wo IV Cont (05.24.23 @ 22:01) >    ACC: [id number] EXAM:  MR ABDOMEN WAW IC   ORDERED BY: BRIANASEBLE JACINTO     PROCEDURE DATE:  05/24/2023          INTERPRETATION:  CLINICAL INFORMATION: Evaluate for HCC. Extensive   mesenteric venous thrombosis.    COMPARISON: CT dated 05/22/2023.    CONTRAST/COMPLICATIONS:  IV Contrast: Gadavist  10 cc administered   0 cc discarded  Oral Contrast: NONE  Complications: None reported at time of study completion    PROCEDURE:  MRI of the abdomen was performed.  MRCP was performed.    FINDINGS:  LOWER CHEST: Within normal limits.    LIVER: Tiny cyst segment 6. Heterogeneous enhancement, probably related   to extensive portal venous thrombosis. No arterial phase hyperenhancing   lesion to suggest HCC.  BILE DUCTS: Normal caliber.  GALLBLADDER: Cholelithiasis.  SPLEEN: Within normal limits.  PANCREAS: Within normal limits.  ADRENALS: Within normal limits.  KIDNEYS/URETERS: A few tiny left renal cysts.    VISUALIZED PORTIONS:  BOWEL: Within normal limits. No evidence of bowel ischemia.  PERITONEUM: No ascites. Trace mesenteric edema.  VESSELS: Evidence of thrombosis involving the main, left and right portal   veins, splenic, superior and inferior mesenteric veins. Evidence of   associated collaterals including cavernous transformation of the main   portal vein.  RETROPERITONEUM/LYMPH NODES: No lymphadenopathy.  ABDOMINAL WALL: Within normal limits.  BONES: Within normal limits.    IMPRESSION:  Extensive mesenteric, splenic and portal venous thrombosis with cavernous   transformation and additional venous collaterals.    No evidence for HCC.    Cholelithiasis.    Additional findings as above.        --- End of Report ---            PRASHANTH LAMB MD; Attending Radiologist  This document has been electronically signed. May 25 2023  9:44AM    < end of copied text >

## 2023-05-25 NOTE — DISCHARGE NOTE PROVIDER - NSDCMRMEDTOKEN_GEN_ALL_CORE_FT
Eliquis 5 mg oral tablet: 1 tab(s) orally 2 times a day  Xarelto 15 mg oral tablet: 1 tab(s) orally 2 times a day   Eliquis 5 mg oral tablet: 2 tab(s) orally 2 times a day  Eliquis 5 mg oral tablet: 1 tab(s) orally 2 times a day

## 2023-05-25 NOTE — PROGRESS NOTE ADULT - PROBLEM SELECTOR PLAN 2
No hx of cancer, weight loss, or other constitutional symptoms,   Pt quit smoking and drinking for the past 8 yrs, no hx of cirrhosis  CT abd and pelvis w IV contrast: Small hypodense lesion in the right hepatic lobe, too small to characterize. Mild heterogeneous enhancement pattern of the liver.  - MRI 5/25- No arterial phase hyper-enhancing lesion to suggest HCC. Extensive mesenteric, splenic and portal venous thrombosis with cavernous transformation and additional venous collaterals. Cholelithiasis.   - tumor markers AFP, CEA, - negative   - [facility] consulted  - Labs sent for hepatic malignancy like ca 19-9, CEA, came back negative, B-glycoprotein negative, anti-cardiolipin negative. MPN panel are pending. Ordered Flow Cyt to r/o [facility]

## 2023-05-26 ENCOUNTER — TRANSCRIPTION ENCOUNTER (OUTPATIENT)
Age: 53
End: 2023-05-26

## 2023-05-26 VITALS
DIASTOLIC BLOOD PRESSURE: 75 MMHG | RESPIRATION RATE: 20 BRPM | HEART RATE: 73 BPM | TEMPERATURE: 98 F | OXYGEN SATURATION: 92 % | SYSTOLIC BLOOD PRESSURE: 121 MMHG

## 2023-05-26 LAB
ALBUMIN SERPL ELPH-MCNC: 3.4 G/DL — LOW (ref 3.5–5)
ALP SERPL-CCNC: 72 U/L — SIGNIFICANT CHANGE UP (ref 40–120)
ALT FLD-CCNC: 59 U/L DA — SIGNIFICANT CHANGE UP (ref 10–60)
ANION GAP SERPL CALC-SCNC: 6 MMOL/L — SIGNIFICANT CHANGE UP (ref 5–17)
AST SERPL-CCNC: 44 U/L — HIGH (ref 10–40)
BASOPHILS # BLD AUTO: 0.05 K/UL — SIGNIFICANT CHANGE UP (ref 0–0.2)
BASOPHILS NFR BLD AUTO: 1.1 % — SIGNIFICANT CHANGE UP (ref 0–2)
BILIRUB SERPL-MCNC: 0.5 MG/DL — SIGNIFICANT CHANGE UP (ref 0.2–1.2)
BUN SERPL-MCNC: 10 MG/DL — SIGNIFICANT CHANGE UP (ref 7–18)
CALCIUM SERPL-MCNC: 9.2 MG/DL — SIGNIFICANT CHANGE UP (ref 8.4–10.5)
CALRETICULIN INTERPRETATION: SIGNIFICANT CHANGE UP
CHLORIDE SERPL-SCNC: 109 MMOL/L — HIGH (ref 96–108)
CO2 SERPL-SCNC: 27 MMOL/L — SIGNIFICANT CHANGE UP (ref 22–31)
CREAT SERPL-MCNC: 1.02 MG/DL — SIGNIFICANT CHANGE UP (ref 0.5–1.3)
EGFR: 87 ML/MIN/1.73M2 — SIGNIFICANT CHANGE UP
EOSINOPHIL # BLD AUTO: 0.06 K/UL — SIGNIFICANT CHANGE UP (ref 0–0.5)
EOSINOPHIL NFR BLD AUTO: 1.3 % — SIGNIFICANT CHANGE UP (ref 0–6)
GLUCOSE SERPL-MCNC: 90 MG/DL — SIGNIFICANT CHANGE UP (ref 70–99)
HCT VFR BLD CALC: 43.1 % — SIGNIFICANT CHANGE UP (ref 39–50)
HGB BLD-MCNC: 14.2 G/DL — SIGNIFICANT CHANGE UP (ref 13–17)
IMM GRANULOCYTES NFR BLD AUTO: 0.2 % — SIGNIFICANT CHANGE UP (ref 0–0.9)
LYMPHOCYTES # BLD AUTO: 1.57 K/UL — SIGNIFICANT CHANGE UP (ref 1–3.3)
LYMPHOCYTES # BLD AUTO: 33.3 % — SIGNIFICANT CHANGE UP (ref 13–44)
MAGNESIUM SERPL-MCNC: 2 MG/DL — SIGNIFICANT CHANGE UP (ref 1.6–2.6)
MCHC RBC-ENTMCNC: 29.8 PG — SIGNIFICANT CHANGE UP (ref 27–34)
MCHC RBC-ENTMCNC: 32.9 GM/DL — SIGNIFICANT CHANGE UP (ref 32–36)
MCV RBC AUTO: 90.4 FL — SIGNIFICANT CHANGE UP (ref 80–100)
MONOCYTES # BLD AUTO: 0.56 K/UL — SIGNIFICANT CHANGE UP (ref 0–0.9)
MONOCYTES NFR BLD AUTO: 11.9 % — SIGNIFICANT CHANGE UP (ref 2–14)
MPL EXON 10 MUTATION: SIGNIFICANT CHANGE UP
NEUTROPHILS # BLD AUTO: 2.46 K/UL — SIGNIFICANT CHANGE UP (ref 1.8–7.4)
NEUTROPHILS NFR BLD AUTO: 52.2 % — SIGNIFICANT CHANGE UP (ref 43–77)
NRBC # BLD: 0 /100 WBCS — SIGNIFICANT CHANGE UP (ref 0–0)
PHOSPHATE SERPL-MCNC: 3.6 MG/DL — SIGNIFICANT CHANGE UP (ref 2.5–4.5)
PLATELET # BLD AUTO: 257 K/UL — SIGNIFICANT CHANGE UP (ref 150–400)
POTASSIUM SERPL-MCNC: 4.1 MMOL/L — SIGNIFICANT CHANGE UP (ref 3.5–5.3)
POTASSIUM SERPL-SCNC: 4.1 MMOL/L — SIGNIFICANT CHANGE UP (ref 3.5–5.3)
PROT SERPL-MCNC: 7.9 G/DL — SIGNIFICANT CHANGE UP (ref 6–8.3)
RBC # BLD: 4.77 M/UL — SIGNIFICANT CHANGE UP (ref 4.2–5.8)
RBC # FLD: 11.6 % — SIGNIFICANT CHANGE UP (ref 10.3–14.5)
SODIUM SERPL-SCNC: 142 MMOL/L — SIGNIFICANT CHANGE UP (ref 135–145)
WBC # BLD: 4.71 K/UL — SIGNIFICANT CHANGE UP (ref 3.8–10.5)
WBC # FLD AUTO: 4.71 K/UL — SIGNIFICANT CHANGE UP (ref 3.8–10.5)

## 2023-05-26 PROCEDURE — 85610 PROTHROMBIN TIME: CPT

## 2023-05-26 PROCEDURE — 80074 ACUTE HEPATITIS PANEL: CPT

## 2023-05-26 PROCEDURE — G1004: CPT

## 2023-05-26 PROCEDURE — 88185 FLOWCYTOMETRY/TC ADD-ON: CPT

## 2023-05-26 PROCEDURE — 87389 HIV-1 AG W/HIV-1&-2 AB AG IA: CPT

## 2023-05-26 PROCEDURE — 83605 ASSAY OF LACTIC ACID: CPT

## 2023-05-26 PROCEDURE — 88184 FLOWCYTOMETRY/ TC 1 MARKER: CPT

## 2023-05-26 PROCEDURE — 84100 ASSAY OF PHOSPHORUS: CPT

## 2023-05-26 PROCEDURE — 85025 COMPLETE CBC W/AUTO DIFF WBC: CPT

## 2023-05-26 PROCEDURE — 82105 ALPHA-FETOPROTEIN SERUM: CPT

## 2023-05-26 PROCEDURE — 81001 URINALYSIS AUTO W/SCOPE: CPT

## 2023-05-26 PROCEDURE — 96365 THER/PROPH/DIAG IV INF INIT: CPT

## 2023-05-26 PROCEDURE — 74183 MRI ABD W/O CNTR FLWD CNTR: CPT

## 2023-05-26 PROCEDURE — 86301 IMMUNOASSAY TUMOR CA 19-9: CPT

## 2023-05-26 PROCEDURE — 86147 CARDIOLIPIN ANTIBODY EA IG: CPT

## 2023-05-26 PROCEDURE — 83615 LACTATE (LD) (LDH) ENZYME: CPT

## 2023-05-26 PROCEDURE — 99239 HOSP IP/OBS DSCHRG MGMT >30: CPT | Mod: GC

## 2023-05-26 PROCEDURE — 85613 RUSSELL VIPER VENOM DILUTED: CPT

## 2023-05-26 PROCEDURE — 80053 COMPREHEN METABOLIC PANEL: CPT

## 2023-05-26 PROCEDURE — 81270 JAK2 GENE: CPT

## 2023-05-26 PROCEDURE — 85732 THROMBOPLASTIN TIME PARTIAL: CPT

## 2023-05-26 PROCEDURE — 83690 ASSAY OF LIPASE: CPT

## 2023-05-26 PROCEDURE — 71250 CT THORAX DX C-: CPT

## 2023-05-26 PROCEDURE — 85027 COMPLETE CBC AUTOMATED: CPT

## 2023-05-26 PROCEDURE — 86304 IMMUNOASSAY TUMOR CA 125: CPT

## 2023-05-26 PROCEDURE — A9579: CPT

## 2023-05-26 PROCEDURE — 99285 EMERGENCY DEPT VISIT HI MDM: CPT | Mod: 25

## 2023-05-26 PROCEDURE — 80048 BASIC METABOLIC PNL TOTAL CA: CPT

## 2023-05-26 PROCEDURE — 36415 COLL VENOUS BLD VENIPUNCTURE: CPT

## 2023-05-26 PROCEDURE — 74177 CT ABD & PELVIS W/CONTRAST: CPT | Mod: MG

## 2023-05-26 PROCEDURE — 81338 MPL GENE COMMON VARIANTS: CPT

## 2023-05-26 PROCEDURE — 81219 CALR GENE COM VARIANTS: CPT

## 2023-05-26 PROCEDURE — 85730 THROMBOPLASTIN TIME PARTIAL: CPT

## 2023-05-26 PROCEDURE — G0103: CPT

## 2023-05-26 PROCEDURE — 82378 CARCINOEMBRYONIC ANTIGEN: CPT

## 2023-05-26 PROCEDURE — 87205 SMEAR GRAM STAIN: CPT

## 2023-05-26 PROCEDURE — 83735 ASSAY OF MAGNESIUM: CPT

## 2023-05-26 PROCEDURE — 86146 BETA-2 GLYCOPROTEIN ANTIBODY: CPT

## 2023-05-26 RX ORDER — APIXABAN 2.5 MG/1
2 TABLET, FILM COATED ORAL
Qty: 20 | Refills: 0
Start: 2023-05-26 | End: 2023-05-30

## 2023-05-26 RX ORDER — APIXABAN 2.5 MG/1
2 TABLET, FILM COATED ORAL
Qty: 28 | Refills: 0
Start: 2023-05-26 | End: 2023-06-01

## 2023-05-26 RX ORDER — APIXABAN 2.5 MG/1
5 TABLET, FILM COATED ORAL EVERY 12 HOURS
Refills: 0 | Status: DISCONTINUED | OUTPATIENT
Start: 2023-05-26 | End: 2023-05-26

## 2023-05-26 RX ORDER — APIXABAN 2.5 MG/1
10 TABLET, FILM COATED ORAL EVERY 12 HOURS
Refills: 0 | Status: DISCONTINUED | OUTPATIENT
Start: 2023-05-26 | End: 2023-05-26

## 2023-05-26 RX ORDER — APIXABAN 2.5 MG/1
1 TABLET, FILM COATED ORAL
Qty: 60 | Refills: 0
Start: 2023-05-26 | End: 2023-06-24

## 2023-05-26 RX ADMIN — ENOXAPARIN SODIUM 100 MILLIGRAM(S): 100 INJECTION SUBCUTANEOUS at 06:40

## 2023-05-26 NOTE — PROGRESS NOTE ADULT - PROVIDER SPECIALTY LIST ADULT
Surgery
Surgery
Internal Medicine
Surgery
Heme/Onc
Heme/Onc
Surgery
Surgery
Heme/Onc
Vascular Surgery
Internal Medicine

## 2023-05-26 NOTE — PROGRESS NOTE ADULT - NS ATTEND OPT1 GEN_ALL_CORE
I independently performed the documented:
I independently performed the documented:
I attest my time as attending is greater than 50% of the total combined time spent on qualifying patient care activities by the PA/NP and attending.
I attest my time as attending is greater than 50% of the total combined time spent on qualifying patient care activities by the PA/NP and attending.
I independently performed the documented:

## 2023-05-26 NOTE — PROGRESS NOTE ADULT - REASON FOR ADMISSION
Portal vein thrombosis and suspected malignancy

## 2023-05-26 NOTE — PROGRESS NOTE ADULT - SUBJECTIVE AND OBJECTIVE BOX
INTERVAL HPI/OVERNIGHT EVENTS:  Pt seen and examined at bedside.   Pt resting comfortably. No acute complaints.   Tolerating diet.   Denies N/V  Denies abdominal pain     MEDICATIONS  (STANDING):  apixaban 10 milliGRAM(s) Oral every 12 hours    MEDICATIONS  (PRN):  acetaminophen     Tablet .. 650 milliGRAM(s) Oral every 6 hours PRN Mild Pain (1 - 3)  aluminum hydroxide/magnesium hydroxide/simethicone Suspension 30 milliLiter(s) Oral every 4 hours PRN Dyspepsia      Vital Signs Last 24 Hrs  T(C): 36.7 (26 May 2023 04:35), Max: 36.7 (25 May 2023 13:34)  T(F): 98.1 (26 May 2023 04:35), Max: 98.1 (25 May 2023 13:34)  HR: 60 (26 May 2023 04:35) (60 - 77)  BP: 104/65 (26 May 2023 04:35) (104/65 - 110/74)  BP(mean): --  RR: 18 (26 May 2023 04:35) (18 - 20)  SpO2: 95% (26 May 2023 04:35) (93% - 95%)    Parameters below as of 26 May 2023 04:35  Patient On (Oxygen Delivery Method): room air        Physical:  General: A&Ox3. NAD.  Respirations: Unlabored  Abdomen: Soft nondistended, nontender. no rebound, no guarding, no peritonitis     I&O's Detail      LABS:                        14.2   4.71  )-----------( 257      ( 26 May 2023 06:09 )             43.1             05-26    142  |  109<H>  |  10  ----------------------------<  90  4.1   |  27  |  1.02    Ca    9.2      26 May 2023 06:09  Phos  3.6     05-26  Mg     2.0     05-26    TPro  7.9  /  Alb  3.4<L>  /  TBili  0.5  /  DBili  x   /  AST  44<H>  /  ALT  59  /  AlkPhos  72  05-26

## 2023-05-26 NOTE — PROGRESS NOTE ADULT - NS ATTEND AMEND GEN_ALL_CORE FT
Patient seen/examined. On full A/C. Started on liquids. Abdomen benign. Continue anticoagulation, heme/hepatology workup.
Pt seen and examined. Also spoke with daughter Bharath via telephone at bedside. D/w pt and daughter MRI findings, persistent thrombosis, no evidence bowel ischemia. Pt denies any pain after eating. Tolerating regular diet. Having bowel movements, mostly liquid, some solid, no blood in stool. Pt with nml WBC.   Continue anticoagulation per Medicine team.   No surgical intervention indicated at this time. OK to discharge when medically stable.
Pt seen and examined. Daughter at bedside. Denies abdominal pain. States is hungry. Passing flatus, reports had a bowel movement which was normal.  Labs wnl, wbc nml. Lac 0.9  Gen-NAD, HD stable, afebrile. Abd- soft, non distended, non tender, no guarding no rebound.  Cont therapeutic lovenox per Hem Onc.   Serial abdominal exams, f/u MRI.
# Portal Vein Thrombosis: - unprovoked; unclear etiology; reviewed CT chest;   MRI liver negative for HCC   -Switch to DOAC   -Out-pt f/u [facility]N and [facility] panel f/u     # Liver lesion - CT reviewed; MRI, liver protocol negative for HCC   WNL AFP / CEA; negative tumor markers     Out-pt f/u with Dr. Calvin
CC: Portal vein thrombus  INTERVAL HPI: Patient seen and examined at bedside; Events noted; Patient reports resolution of ab pain    MEDICATIONS  (STANDING):  enoxaparin Injectable 100 milliGRAM(s) SubCutaneous every 12 hours  sodium chloride 0.9%. 1000 milliLiter(s) (125 mL/Hr) IV Continuous <Continuous>    MEDICATIONS  (PRN):      Vital Signs Last 24 Hrs  T(C): 36.7 (23 May 2023 13:32), Max: 37.1 (22 May 2023 21:45)  T(F): 98 (23 May 2023 13:32), Max: 98.7 (22 May 2023 21:45)  HR: 74 (23 May 2023 13:32) (70 - 75)  BP: 109/66 (23 May 2023 13:32) (99/55 - 109/66)  BP(mean): --  RR: 18 (23 May 2023 13:32) (17 - 18)  SpO2: 95% (23 May 2023 13:32) (95% - 98%)    Parameters below as of 23 May 2023 13:32  Patient On (Oxygen Delivery Method): room air      _________________  PHYSICAL EXAM:  ---------------------------  GEN: NAD; NC/AT; A and O x 3  LUNGS: no wheezing; decreased bilateral air entry; no use of accessory muscles for breathing  HEART: Nl S1 S2; no M   ABDOMEN: Soft, Nontender, non distended  EXTREMITIES: no cyanosis; no edema; warm and dry  NERVOUS SYSTEM:  Awake and alert; no focal neuro  deficits    _________________________________________________  LABS:                        14.4   5.75  )-----------( 197      ( 23 May 2023 06:55 )             43.1     05-23    140  |  110<H>  |  12  ----------------------------<  83  3.9   |  24  |  0.78    Ca    9.1      23 May 2023 06:55  Phos  3.5     05-23  Mg     2.3     05-23    TPro  7.4  /  Alb  3.2<L>  /  TBili  0.6  /  DBili  x   /  AST  21  /  ALT  39  /  AlkPhos  76  05-22    PT/INR - ( 22 May 2023 12:33 )   PT: 15.1 sec;   INR: 1.27 ratio         PTT - ( 23 May 2023 06:55 )  PTT:38.1 sec    A/P  Problem #1 PVT - unprovoked; unclear etiology; reviewed CT chest; awaiting MRI of the liver  -continue w/  LMWH at 1 mg/kg q12; can transition to DOAC (i.e. apixaban loading dose followed by maintenance dose) if no isurgical intervention is planned  -agree w/ malignancy work up as well as MPN and APLA and LDH; can f/up with outpt heme/onc    Problem #2 Liver lesion - CT finding suggestive of abnormal lesions; awaiting MRI, liver protocol  -reviewed AFP, CEA, CA 19-9 as well as coags and hep B/C/HIV screening  -obtain outside colonoscopy report    I have examined the patient at bedside and reviewed patient's data and participated in the management of the patient along with Valeria Gonzalez well as hemotology/med oncology faculty consisting of Dr. OMAR Leal, Dr. OMAR Hutton, Dr. Chris Salazar, Dr. Ritu Messer, Dr. Titus Vora as well as myself during the daily heme/onc case review. I reviewed pertinent clinical information, PE,  labs as well as A/P as outline above.     Call with questions [phone]    Miguel Angel Calvin MD

## 2023-05-26 NOTE — PROGRESS NOTE ADULT - NSPROGADDITIONALINFOA_GEN_ALL_CORE
Pt seen and examined. Daughter at bedside. Denies abdominal pain. States is hungry. Passing flatus, reports had a bowel movement which was normal.  Labs wnl, wbc nml. Lac 0.9  Gen-NAD, HD stable, afebrile. Abd- soft, non distended, non tender, no guarding no rebound.  Cont therapeutic lovenox per Hem Onc.   Serial abdominal exams, f/u MRI.
Pt seen and examined. Also spoke with daughter Bharath via telephone at bedside. D/w pt and daughter MRI findings, persistent thrombosis, no evidence bowel ischemia. Pt denies any pain after eating. Tolerating regular diet. Having bowel movements, mostly liquid, some solid, no blood in stool. Pt with nml WBC.   Continue anticoagulation per Medicine team.   No surgical intervention indicated at this time. OK to discharge when medically stable.

## 2023-05-26 NOTE — DISCHARGE NOTE NURSING/CASE MANAGEMENT/SOCIAL WORK - PATIENT PORTAL LINK FT
You can access the FollowMyHealth Patient Portal offered by [facility] by registering at the following website: [email or web address]. By joining [facility]’s FollowMyHealth portal, you will also be able to view your health information using other applications (apps) compatible with our system.

## 2023-05-26 NOTE — PROGRESS NOTE ADULT - ASSESSMENT
53y.o. Male with resolving mesenteric ischemia, intraabd venous thrombosis. MRI 5/25 showing extensive mesenteric, splenic and portal venous thrombosis with cavernous transofrmation and additional venous collaterals, no evidence of HCC, cholelithiasis reviewed with attending. Pt clinically improved, labs and exam benign     -Diet as tolerated   -con't FD Lovenox  -No acute surgical intervention  -Care per primary   -Discussed with Dr. Hadley

## 2023-05-30 LAB — TM INTERPRETATION: SIGNIFICANT CHANGE UP

## 2023-05-31 PROBLEM — Z00.00 ENCOUNTER FOR PREVENTIVE HEALTH EXAMINATION: Status: ACTIVE | Noted: 2023-05-31

## 2023-06-01 LAB
JAK2 EXON 13 MUT ANL BLD/T: SIGNIFICANT CHANGE UP
REFLEX:: SIGNIFICANT CHANGE UP

## 2023-06-03 RX ORDER — APIXABAN 2.5 MG/1
1 TABLET, FILM COATED ORAL
Qty: 60 | Refills: 0
Start: 2023-06-03 | End: 2023-07-02

## 2023-06-06 ENCOUNTER — APPOINTMENT (OUTPATIENT)
Dept: GASTROENTEROLOGY | Facility: CLINIC | Age: 53
End: 2023-06-06

## 2023-10-18 ENCOUNTER — APPOINTMENT (OUTPATIENT)
Dept: CT IMAGING | Facility: HOSPITAL | Age: 53
End: 2023-10-18

## 2023-10-20 ENCOUNTER — OUTPATIENT (OUTPATIENT)
Dept: OUTPATIENT SERVICES | Facility: HOSPITAL | Age: 53
LOS: 1 days | Discharge: HOME | End: 2023-10-20
Payer: MEDICAID

## 2023-10-20 ENCOUNTER — APPOINTMENT (OUTPATIENT)
Dept: CT IMAGING | Facility: HOSPITAL | Age: 53
End: 2023-10-20

## 2023-10-20 DIAGNOSIS — I82.90 ACUTE EMBOLISM AND THROMBOSIS OF UNSPECIFIED VEIN: ICD-10-CM

## 2023-10-20 DIAGNOSIS — Z98.890 OTHER SPECIFIED POSTPROCEDURAL STATES: Chronic | ICD-10-CM

## 2023-10-20 PROCEDURE — 74177 CT ABD & PELVIS W/CONTRAST: CPT | Mod: 26

## 2024-05-13 ENCOUNTER — RESULT REVIEW (OUTPATIENT)
Age: 54
End: 2024-05-13

## 2024-05-13 ENCOUNTER — APPOINTMENT (OUTPATIENT)
Dept: CT IMAGING | Facility: CLINIC | Age: 54
End: 2024-05-13
Payer: COMMERCIAL

## 2024-05-13 PROCEDURE — 74160 CT ABDOMEN W/CONTRAST: CPT

## 2025-03-20 ENCOUNTER — RESULT REVIEW (OUTPATIENT)
Age: 55
End: 2025-03-20

## 2025-03-20 ENCOUNTER — APPOINTMENT (OUTPATIENT)
Dept: CT IMAGING | Facility: CLINIC | Age: 55
End: 2025-03-20
Payer: COMMERCIAL

## 2025-03-20 PROCEDURE — 74177 CT ABD & PELVIS W/CONTRAST: CPT
